# Patient Record
Sex: FEMALE | Race: OTHER | HISPANIC OR LATINO | Employment: UNEMPLOYED | URBAN - METROPOLITAN AREA
[De-identification: names, ages, dates, MRNs, and addresses within clinical notes are randomized per-mention and may not be internally consistent; named-entity substitution may affect disease eponyms.]

---

## 2017-04-15 ENCOUNTER — OFFICE VISIT (OUTPATIENT)
Dept: URGENT CARE | Facility: CLINIC | Age: 37
End: 2017-04-15
Payer: COMMERCIAL

## 2017-04-15 VITALS
TEMPERATURE: 97.5 F | HEART RATE: 82 BPM | SYSTOLIC BLOOD PRESSURE: 112 MMHG | WEIGHT: 178 LBS | OXYGEN SATURATION: 99 % | RESPIRATION RATE: 16 BRPM | BODY MASS INDEX: 28.74 KG/M2 | DIASTOLIC BLOOD PRESSURE: 80 MMHG

## 2017-04-15 DIAGNOSIS — J30.1 SEASONAL ALLERGIC RHINITIS DUE TO POLLEN: ICD-10-CM

## 2017-04-15 DIAGNOSIS — L50.9 URTICARIAL RASH: ICD-10-CM

## 2017-04-15 PROCEDURE — 99214 OFFICE O/P EST MOD 30 MIN: CPT | Performed by: PHYSICIAN ASSISTANT

## 2017-04-15 RX ORDER — METHYLPREDNISOLONE 4 MG/1
TABLET ORAL
Qty: 1 TAB | Refills: 0 | Status: SHIPPED | OUTPATIENT
Start: 2017-04-15 | End: 2021-03-17

## 2017-04-15 RX ORDER — TRIAMCINOLONE ACETONIDE 40 MG/ML
40 INJECTION, SUSPENSION INTRA-ARTICULAR; INTRAMUSCULAR ONCE
Status: COMPLETED | OUTPATIENT
Start: 2017-04-15 | End: 2017-04-15

## 2017-04-15 RX ORDER — HYDROXYZINE HYDROCHLORIDE 25 MG/1
25 TABLET, FILM COATED ORAL 3 TIMES DAILY PRN
Qty: 24 TAB | Refills: 0 | Status: SHIPPED | OUTPATIENT
Start: 2017-04-15 | End: 2023-02-26

## 2017-04-15 RX ADMIN — TRIAMCINOLONE ACETONIDE 40 MG: 40 INJECTION, SUSPENSION INTRA-ARTICULAR; INTRAMUSCULAR at 12:24

## 2017-04-15 ASSESSMENT — ENCOUNTER SYMPTOMS
NEUROLOGICAL NEGATIVE: 1
MUSCULOSKELETAL NEGATIVE: 1
RESPIRATORY NEGATIVE: 1
CONSTITUTIONAL NEGATIVE: 1

## 2017-04-15 NOTE — MR AVS SNAPSHOT
Milly Singh   4/15/2017 11:30 AM   Office Visit   MRN: [id number]    Department:  [facility]   Dept Phone:  [phone]    Description:  Female : 1980   Provider:  Javid Monzon PA-C           Reason for Visit     Rash x 2 days, itchy rash all over      Allergies as of 4/15/2017     Allergen Noted Reactions    Chlorpheniramine 04/15/2013   Rash    Penicillins 2009       Phenylephrine 04/15/2013   Rash      You were diagnosed with     Urticarial rash   [511329]       Seasonal allergic rhinitis due to pollen   [[id number]]         Vital Signs     Blood Pressure Pulse Temperature Respirations Weight Oxygen Saturation    112/80 mmHg 82 36.4 °C (97.5 °F) 16 80.74 kg (178 lb) 99%    Smoking Status                   Never Smoker            Basic Information     Date Of Birth Sex Race Ethnicity Preferred Language    1980 Female  or   Origin (Slovak,Maltese,Mozambican,Malawian, etc) English      Problem List              ICD-10-CM Priority Class Noted - Resolved    Thyroid enlargement E04.9   2014 - Present    History of acute pancreatitis Z87.19   2014 - Present    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      Below and/or attached are the medications your provider expects you to take. Review all of your home medications and newly ordered medications with your provider and/or pharmacist. Follow medication instructions as directed by your provider and/or pharmacist. Please keep your medication list with you and share with your provider. Update the information when medications are discontinued, doses are changed, or new medications (including over-the-counter products) are added; and carry  medication information at all times in the event of emergency situations     Allergies:  CHLORPHENIRAMINE - Rash     PENICILLINS - (reactions not documented)     PHENYLEPHRINE - Rash               Medications  Valid as of: April 15, 2017 - 12:27 PM    Generic Name Brand Name Tablet Size Instructions for use    Digestive Enzymes   Take  by mouth 4 times a day.        HydrOXYzine HCl (Tab) ATARAX 25 MG Take 1 Tab by mouth 3 times a day as needed for Itching (or rash).        MethylPREDNISolone (Tablet Therapy Pack) MEDROL DOSEPAK 4 MG U.D.        .                 Medicines prescribed today were sent to:     [facility] [address] [place] [address] (), [place] - [address]    [address] () [place] [address]    Phone: [phone] Fax: [phone]    Open 24 Hours?: No      Medication refill instructions:       If your prescription bottle indicates you have medication refills left, it is not necessary to call your provider’s office. Please contact your pharmacy and they will refill your medication.    If your prescription bottle indicates you do not have any refills left, you may request refills at any time through one of the following ways: The online R-Squared system (except Urgent Care), by calling your provider’s office, or by asking your pharmacy to contact your provider’s office with a refill request. Medication refills are processed only during regular business hours and may not be available until the next business day. Your provider may request additional information or to have a follow-up visit with you prior to refilling your medication.   *Please Note: Medication refills are assigned a new Rx number when refilled electronically. Your pharmacy may indicate that no refills were authorized even though a new prescription for the same medication is available at the pharmacy. Please request the medicine by name with the pharmacy before contacting your provider for a refill.           R-Squared Access Code:  [email or web address]  Expires: 5/15/2017 12:27 PM    Histogen  A secure, online tool to manage your health information     [employer]’s Histogen® is a secure, online tool that connects you to your personalized health information from the privacy of your home -- day or night - making it very easy for you to manage your healthcare. Once the activation process is completed, you can even access your medical information using the Histogen indira, which is available for free in the Apple Indira store or Google Play store.     Histogen provides the following levels of access (as shown below):   My Chart Features   [facility] Primary Care Doctor [facility]  Specialists [facility]  Urgent  Care Non-[facility]  Primary Care  Doctor   Email your healthcare team securely and privately 24/7 X X X    Manage appointments: schedule your next appointment; view details of past/upcoming appointments X      Request prescription refills. X      View recent personal medical records, including lab and immunizations X X X X   View health record, including health history, allergies, medications X X X X   Read reports about your outpatient visits, procedures, consult and ER notes X X X X   See your discharge summary, which is a recap of your hospital and/or ER visit that includes your diagnosis, lab results, and care plan. X X       How to register for Histogen:  1. Go to  [email or web address].  2. Click on the Sign Up Now box, which takes you to the New Member Sign Up page. You will need to provide the following information:  a. Enter your Histogen Access Code exactly as it appears at the top of this page. (You will not need to use this code after you’ve completed the sign-up process. If you do not sign up before the expiration date, you must request a new code.)   b. Enter your date of birth.   c. Enter your home email address.   d. Click Submit, and follow the next screen’s instructions.  3. Create a Histogen ID. This will be your Histogen login ID and cannot be  changed, so think of one that is secure and easy to remember.  4. Create a Ioxus password. You can change your password at any time.  5. Enter your Password Reset Question and Answer. This can be used at a later time if you forget your password.   6. Enter your e-mail address. This allows you to receive e-mail notifications when new information is available in Ioxus.  7. Click Sign Up. You can now view your health information.    For assistance activating your Ioxus account, call [phone]

## 2017-04-15 NOTE — PROGRESS NOTES
Subjective:      Milly Singh is a 36 y.o. female who presents with Rash            Rash  This is a new problem. The current episode started yesterday. The problem is unchanged. The rash is diffuse. The rash is characterized by itchiness. Past treatments include nothing. The treatment provided no relief. There is no history of allergies or eczema.       Review of Systems   Constitutional: Negative.    HENT: Negative.    Respiratory: Negative.    Musculoskeletal: Negative.    Skin: Positive for itching and rash.   Neurological: Negative.           Objective:     /80 mmHg  Pulse 82  Temp(Src) 36.4 °C (97.5 °F)  Resp 16  Wt 80.74 kg (178 lb)  SpO2 99%     Physical Exam   Constitutional: She is oriented to person, place, and time. She appears well-developed and well-nourished. No distress.   HENT:   Head: Normocephalic and atraumatic.   Mouth/Throat: Oropharynx is clear and moist.   Musculoskeletal: She exhibits no tenderness.   Neurological: She is alert and oriented to person, place, and time. She exhibits normal muscle tone. Coordination normal.   Skin: Skin is warm and dry. Rash noted.   Psychiatric: She has a normal mood and affect. Her behavior is normal. Judgment and thought content normal.   Nursing note and vitals reviewed.    Filed Vitals:    04/15/17 1158   BP: 112/80   Pulse: 82   Temp: 36.4 °C (97.5 °F)   Resp: 16   Weight: 80.74 kg (178 lb)   SpO2: 99%     Active Ambulatory Problems     Diagnosis Date Noted   • Thyroid enlargement 06/13/2014   • History of acute pancreatitis 06/13/2014   • Mucinous neoplasm of pancreas 07/30/2014   • Impaired fasting glucose 11/03/2014     Resolved Ambulatory Problems     Diagnosis Date Noted   • Pancreatitis 06/01/2014   • Hypokalemia 06/02/2014     Past Medical History   Diagnosis Date   • Heart burn      Current Outpatient Prescriptions on File Prior to Visit   Medication Sig Dispense Refill   • Digestive Enzymes (PAPAYA ENZYME PO) Take  by mouth 4 times a  day.       No current facility-administered medications on file prior to visit.     Gargles, Cepacol lozenges, Aleve/Advil as needed for throat pain  Family History   Problem Relation Age of Onset   • Cancer Maternal Aunt 41     breast cancer   • Diabetes Maternal Grandfather      Chlorpheniramine; Penicillins; and Phenylephrine              Assessment/Plan:     ·  urt.rash      · Kenalog 40mg; flonase; atarax

## 2017-04-19 ENCOUNTER — TELEPHONE (OUTPATIENT)
Dept: URGENT CARE | Facility: CLINIC | Age: 37
End: 2017-04-19

## 2017-09-18 ENCOUNTER — HOSPITAL ENCOUNTER (OUTPATIENT)
Facility: MEDICAL CENTER | Age: 37
End: 2017-09-18
Attending: NURSE PRACTITIONER
Payer: COMMERCIAL

## 2017-09-18 ENCOUNTER — OFFICE VISIT (OUTPATIENT)
Dept: MEDICAL GROUP | Facility: MEDICAL CENTER | Age: 37
End: 2017-09-18
Payer: COMMERCIAL

## 2017-09-18 VITALS
RESPIRATION RATE: 16 BRPM | SYSTOLIC BLOOD PRESSURE: 116 MMHG | WEIGHT: 180 LBS | OXYGEN SATURATION: 98 % | HEIGHT: 66 IN | BODY MASS INDEX: 28.93 KG/M2 | DIASTOLIC BLOOD PRESSURE: 68 MMHG | TEMPERATURE: 98 F | HEART RATE: 72 BPM

## 2017-09-18 DIAGNOSIS — R73.09 ELEVATED HEMOGLOBIN A1C: ICD-10-CM

## 2017-09-18 DIAGNOSIS — N89.8 VAGINAL IRRITATION: ICD-10-CM

## 2017-09-18 DIAGNOSIS — Z01.419 ENCOUNTER FOR GYNECOLOGICAL EXAMINATION WITHOUT ABNORMAL FINDING: ICD-10-CM

## 2017-09-18 DIAGNOSIS — Z97.5 IUD (INTRAUTERINE DEVICE) IN PLACE: ICD-10-CM

## 2017-09-18 DIAGNOSIS — E04.9 THYROID ENLARGEMENT: ICD-10-CM

## 2017-09-18 DIAGNOSIS — D49.0 MUCINOUS NEOPLASM OF PANCREAS: ICD-10-CM

## 2017-09-18 LAB
CANDIDA DNA VAG QL PROBE+SIG AMP: NEGATIVE
G VAGINALIS DNA VAG QL PROBE+SIG AMP: NEGATIVE
T VAGINALIS DNA VAG QL PROBE+SIG AMP: NEGATIVE

## 2017-09-18 PROCEDURE — 87660 TRICHOMONAS VAGIN DIR PROBE: CPT

## 2017-09-18 PROCEDURE — 87624 HPV HI-RISK TYP POOLED RSLT: CPT

## 2017-09-18 PROCEDURE — 87480 CANDIDA DNA DIR PROBE: CPT

## 2017-09-18 PROCEDURE — 88175 CYTOPATH C/V AUTO FLUID REDO: CPT

## 2017-09-18 PROCEDURE — 99000 SPECIMEN HANDLING OFFICE-LAB: CPT | Performed by: NURSE PRACTITIONER

## 2017-09-18 PROCEDURE — 87510 GARDNER VAG DNA DIR PROBE: CPT

## 2017-09-18 PROCEDURE — 99395 PREV VISIT EST AGE 18-39: CPT | Performed by: NURSE PRACTITIONER

## 2017-09-18 ASSESSMENT — PATIENT HEALTH QUESTIONNAIRE - PHQ9: CLINICAL INTERPRETATION OF PHQ2 SCORE: 0

## 2017-09-18 NOTE — PROGRESS NOTES
CC:  Pap/Well Woman Exam    History of present illness:  Milly Singh is 36 y.o.  female presenting today to \[facility]\"" care and for well woman exam with gynecological exam and Pap smear. We discussed:    Mucinous neoplasm of pancreas  Drained with MRCP in , no further problems    Thyroid enlargement  Prior notes reviewed, u/s ordered in the past but not completed  Some weight gain- 5 lb in about 2 months  No fatigue    Vaginal irritation  Itching, burning every month just prior to period  No current symptoms  paraguard placed about 7 years ago  LMP was 10 days late, pink spotting for a few days then regular bleeding  Using generic feminine wash in shower  Cotton underwear     Elevated hemoglobin A1c  5.7 in review of labs  Watching her diet, no regular exercise no polyuria, polydipsia, numbness or tingling in extremities      Past Medical History:   Diagnosis Date   • Impaired fasting glucose 11/3/2014   • History of acute pancreatitis 2014   • Pancreatitis 2014    Idiopathic 2014: Lipase was 1407 on admission and 281 on discharge. Lipids were normal. IMAGING: Gallbladder ultrasound showed small simple appearing cyst immediately adjacent to the pancreatic tail, not felt to be of clinical significance. Unremarkable findings, otherwise     • Heart burn        Past Surgical History:   Procedure Laterality Date   • GASTROSCOPY WITH BIOPSY  2014    Performed by Fredi Davis M.D. at SURGERY [facility]   • EGD WITH ASP/BX  2014    Performed by Fredi Davis M.D. at [facility]   • EYE SURGERY  2008    [facility]       Outpatient Encounter Prescriptions as of 2017   Medication Sig Dispense Refill   • hydrOXYzine (ATARAX) 25 MG Tab Take 1 Tab by mouth 3 times a day as needed for Itching (or rash). 24 Tab 0   • MethylPREDNISolone (MEDROL DOSEPAK) 4 MG Tablet Therapy Pack U.D. 1 Tab 0   • Digestive Enzymes (PAPAYA ENZYME PO) Take  by mouth 4 times a  "day.       No facility-administered encounter medications on file as of 9/18/2017.        Patient Active Problem List    Diagnosis Date Noted   • Elevated hemoglobin A1c 09/18/2017   • IUD (intrauterine device) in place 09/18/2017   • Vaginal irritation 09/18/2017   • Impaired fasting glucose 11/03/2014   • Mucinous neoplasm of pancreas 07/30/2014   • Thyroid enlargement 06/13/2014   • History of acute pancreatitis 06/13/2014       .  Social History     Social History   • Marital status:      Spouse name: N/A   • Number of children: N/A   • Years of education: N/A     Occupational History   • Not on file.     Social History Main Topics   • Smoking status: Never Smoker   • Smokeless tobacco: Never Used   • Alcohol use No   • Drug use: No   • Sexual activity: Yes     Partners: Male      Comment: IUD - copper      Other Topics Concern   • Not on file     Social History Narrative    . 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        Family History   Problem Relation Age of Onset   • Cancer Maternal Aunt 41     breast cancer   • Diabetes Maternal Grandfather          ROS:  Denies Weight loss, fatigue, chest pain, SOB, bowel or bladder changes. No significant dysmenorrhea, concerning vaginal discharge or irritation, no dyspareunia or postcoital bleeding. Denies h/o migraine with aura. Denies musculoskeletal, neurological, or psychiatric problems.      /68   Pulse 72   Temp 36.7 °C (98 °F)   Resp 16   Ht 1.664 m (5' 5.5\")   Wt 81.6 kg (180 lb)   SpO2 98%   BMI 29.50 kg/m²     GEN:  Appears well and in no apparent distress   NECK:  Supple without adenopathy, thyromegaly without mass or nodule  LUNGS:  Clear and equal. No wheeze, ronchi, or rales.  CV:  RRR, S1, S2. No murmur.  Pedal pulses 2+ bilaterally.  BREAST:  Symmetrical without masses. No nipple discharge.  ABD:  Soft, non-tender, non-distended, normal bowel sounds.  No hepatosplenomegaly.  :  Normal external female genitalia.  Vaginal canal clear.  " Cervix appears normal. IUD strings visible,. Specimen collected from transformation zone. Bimanual exam:  No CMT, normal size uterus without masses or tenderness; no adnexal masses or tenderness.      Assessment and plan    1. Encounter for gynecological examination without abnormal finding  Normal GYN exam. Pap sent, follow-up pending results. Breast self-exam encouraged monthly. General health and wellness discussion including healthy diet and regular exercise  - THINPREP PAP WITH HPV; Future  2. Mucinous neoplasm of pancreas  Seen by gastroenterology in 2014, no further problems  3. Elevated hemoglobin A1c  - HEMOGLOBIN A1C; Future  - COMP METABOLIC PANEL; Future  4. Thyroid enlargement  Thyromegaly without nodules appreciated. Ultrasound has been ordered in the past but not completed.  New order Given today, labs as listed below  - TSH WITH REFLEX TO FT4; Future  - US-SOFT TISSUES OF HEAD - NECK; Future  5. IUD (intrauterine device) in place  Doing well with paraguard IUD  6. Vaginal irritation   recurrent vaginal irritation just prior to menses each month, not currently experiencing any symptoms. Possibility of yeast vaginitis or bacterial vaginitis discussed. Hygiene recommendations reviewed and advised daily probiotic  - VAGINAL PATHOGENS DNA PANEL; Future      F/u pending results

## 2017-09-18 NOTE — ASSESSMENT & PLAN NOTE
Itching, burning every month just prior to period  No current symptoms  paraguard placed about 7 years ago  LMP was 10 days late, pink spotting for a few days then regular bleeding  Using generic feminine wash in shower  Cotton underwear

## 2017-09-18 NOTE — ASSESSMENT & PLAN NOTE
Prior notes reviewed, u/s ordered in the past but not completed  Some weight gain- 5 lb in about 2 months  No fatigue

## 2017-09-19 ENCOUNTER — TELEPHONE (OUTPATIENT)
Dept: MEDICAL GROUP | Facility: MEDICAL CENTER | Age: 37
End: 2017-09-19

## 2017-09-19 LAB
CYTOLOGY REG CYTOL: ABNORMAL
HPV HR 12 DNA CVX QL NAA+PROBE: POSITIVE
HPV16 DNA SPEC QL NAA+PROBE: NEGATIVE
HPV18 DNA SPEC QL NAA+PROBE: NEGATIVE
SPECIMEN SOURCE: ABNORMAL

## 2017-09-19 NOTE — TELEPHONE ENCOUNTER
----- Message from DAVON Palomino sent at 9/19/2017  7:37 AM PDT -----  Please inform pt:  Vaginal swab shows no evidence of bacterial or yeast infection. Take the probiotic and change soap as we discussed. Please f/u if continuing to have difficulty

## 2017-09-20 ENCOUNTER — TELEPHONE (OUTPATIENT)
Dept: MEDICAL GROUP | Facility: MEDICAL CENTER | Age: 37
End: 2017-09-20

## 2017-09-20 ENCOUNTER — HOSPITAL ENCOUNTER (OUTPATIENT)
Dept: LAB | Facility: MEDICAL CENTER | Age: 37
End: 2017-09-20
Attending: NURSE PRACTITIONER
Payer: COMMERCIAL

## 2017-09-20 DIAGNOSIS — E04.9 THYROID ENLARGEMENT: ICD-10-CM

## 2017-09-20 DIAGNOSIS — R73.09 ELEVATED HEMOGLOBIN A1C: ICD-10-CM

## 2017-09-20 LAB
ALBUMIN SERPL BCP-MCNC: 4.2 G/DL (ref 3.2–4.9)
ALBUMIN/GLOB SERPL: 1.3 G/DL
ALP SERPL-CCNC: 69 U/L (ref 30–99)
ALT SERPL-CCNC: 18 U/L (ref 2–50)
ANION GAP SERPL CALC-SCNC: 9 MMOL/L (ref 0–11.9)
AST SERPL-CCNC: 16 U/L (ref 12–45)
BILIRUB SERPL-MCNC: 0.5 MG/DL (ref 0.1–1.5)
BUN SERPL-MCNC: 14 MG/DL (ref 8–22)
CALCIUM SERPL-MCNC: 9 MG/DL (ref 8.5–10.5)
CHLORIDE SERPL-SCNC: 105 MMOL/L (ref 96–112)
CO2 SERPL-SCNC: 26 MMOL/L (ref 20–33)
CREAT SERPL-MCNC: 0.55 MG/DL (ref 0.5–1.4)
EST. AVERAGE GLUCOSE BLD GHB EST-MCNC: 114 MG/DL
GFR SERPL CREATININE-BSD FRML MDRD: >60 ML/MIN/1.73 M 2
GLOBULIN SER CALC-MCNC: 3.2 G/DL (ref 1.9–3.5)
GLUCOSE SERPL-MCNC: 87 MG/DL (ref 65–99)
HBA1C MFR BLD: 5.6 % (ref 0–5.6)
POTASSIUM SERPL-SCNC: 3.9 MMOL/L (ref 3.6–5.5)
PROT SERPL-MCNC: 7.4 G/DL (ref 6–8.2)
SODIUM SERPL-SCNC: 140 MMOL/L (ref 135–145)
TSH SERPL DL<=0.005 MIU/L-ACNC: 2.99 UIU/ML (ref 0.3–3.7)

## 2017-09-20 PROCEDURE — 84443 ASSAY THYROID STIM HORMONE: CPT

## 2017-09-20 PROCEDURE — 83036 HEMOGLOBIN GLYCOSYLATED A1C: CPT

## 2017-09-20 PROCEDURE — 80053 COMPREHEN METABOLIC PANEL: CPT

## 2017-09-20 PROCEDURE — 36415 COLL VENOUS BLD VENIPUNCTURE: CPT

## 2017-09-20 NOTE — TELEPHONE ENCOUNTER
----- Message from Tri Hatch M.D. sent at 9/20/2017 11:41 AM PDT -----  Please contact pt re results return to clinic in 1 year for repeat cotesting   Tri Hatch M.D.

## 2017-09-25 NOTE — TELEPHONE ENCOUNTER
Please notify patient of thyroid function, average blood sugar, kidney function, liver function are all normal.  Christoph Meeks M.D.

## 2019-09-11 ENCOUNTER — HOSPITAL ENCOUNTER (OUTPATIENT)
Facility: MEDICAL CENTER | Age: 39
End: 2019-09-11
Attending: NURSE PRACTITIONER
Payer: COMMERCIAL

## 2019-09-11 ENCOUNTER — OFFICE VISIT (OUTPATIENT)
Dept: MEDICAL GROUP | Facility: MEDICAL CENTER | Age: 39
End: 2019-09-11
Payer: COMMERCIAL

## 2019-09-11 VITALS
RESPIRATION RATE: 16 BRPM | TEMPERATURE: 97.8 F | HEART RATE: 82 BPM | OXYGEN SATURATION: 98 % | BODY MASS INDEX: 29.25 KG/M2 | WEIGHT: 182 LBS | DIASTOLIC BLOOD PRESSURE: 70 MMHG | HEIGHT: 66 IN | SYSTOLIC BLOOD PRESSURE: 100 MMHG

## 2019-09-11 DIAGNOSIS — N89.8 VAGINAL IRRITATION: ICD-10-CM

## 2019-09-11 DIAGNOSIS — R10.30 LOWER ABDOMINAL PAIN: ICD-10-CM

## 2019-09-11 DIAGNOSIS — R10.9 RIGHT LATERAL ABDOMINAL PAIN: ICD-10-CM

## 2019-09-11 PROCEDURE — 87491 CHLMYD TRACH DNA AMP PROBE: CPT

## 2019-09-11 PROCEDURE — 87086 URINE CULTURE/COLONY COUNT: CPT

## 2019-09-11 PROCEDURE — 87510 GARDNER VAG DNA DIR PROBE: CPT

## 2019-09-11 PROCEDURE — 87660 TRICHOMONAS VAGIN DIR PROBE: CPT

## 2019-09-11 PROCEDURE — 99214 OFFICE O/P EST MOD 30 MIN: CPT | Performed by: NURSE PRACTITIONER

## 2019-09-11 PROCEDURE — 87591 N.GONORRHOEAE DNA AMP PROB: CPT

## 2019-09-11 PROCEDURE — 87480 CANDIDA DNA DIR PROBE: CPT

## 2019-09-11 RX ORDER — AZELASTINE 1 MG/ML
1 SPRAY, METERED NASAL 2 TIMES DAILY
COMMUNITY
End: 2021-09-02

## 2019-09-11 ASSESSMENT — PATIENT HEALTH QUESTIONNAIRE - PHQ9: CLINICAL INTERPRETATION OF PHQ2 SCORE: 0

## 2019-09-12 ENCOUNTER — PATIENT MESSAGE (OUTPATIENT)
Dept: MEDICAL GROUP | Facility: MEDICAL CENTER | Age: 39
End: 2019-09-12

## 2019-09-12 LAB
C TRACH DNA SPEC QL NAA+PROBE: NEGATIVE
CANDIDA DNA VAG QL PROBE+SIG AMP: NEGATIVE
G VAGINALIS DNA VAG QL PROBE+SIG AMP: NEGATIVE
N GONORRHOEA DNA SPEC QL NAA+PROBE: NEGATIVE
SPECIMEN SOURCE: NORMAL
T VAGINALIS DNA VAG QL PROBE+SIG AMP: NEGATIVE

## 2019-09-12 NOTE — PROGRESS NOTES
Subjective:     Chief Complaint   Patient presents with   • UTI     PAIN ON SIDE      Milly Singh is a 38 y.o. female established patient here for evaluation of R sided abd discomfort as well as vaginal odor/ irritation  The abdominal discomfort started about 1 week ago, there is no particular aggravating or alleviating factors.  Comes and goes unexpectedly and not associated with eating.  She describes it as mild to moderate, sharp at times.  There is no associated nausea, vomiting, stool changes, bloating.  She denies constipation or diarrhea.  She does have history of mucinous cyst removed from the pancreas a few years ago.  No history of renal calculi.  No fever, chills  Vaginal irritation seems to come and go, tends to occur before her menses and then cleared up for a period of time afterwards.  The irritation and odor tend to be worse after intercourse.  She has not had any recent change in hygiene products, not using any fragranced products in the vaginal area.  No new partners, no irregular bleeding or postcoital bleeding.  She has a ParaGard IUD    No problem-specific Assessment & Plan notes found for this encounter.       Current medicines (including changes today)  Current Outpatient Medications   Medication Sig Dispense Refill   • azelastine (ASTELIN) 137 MCG/SPRAY nasal spray [place] 1 Spray in nose 2 times a day.     • hydrOXYzine (ATARAX) 25 MG Tab Take 1 Tab by mouth 3 times a day as needed for Itching (or rash). 24 Tab 0   • MethylPREDNISolone (MEDROL DOSEPAK) 4 MG Tablet Therapy Pack U.D. (Patient not taking: Reported on 9/11/2019) 1 Tab 0   • Digestive Enzymes (PAPAYA ENZYME PO) Take  by mouth 4 times a day.       No current facility-administered medications for this visit.      She  has a past medical history of Heart burn, History of acute pancreatitis (6/13/2014), Impaired fasting glucose (11/3/2014), and Pancreatitis (6/1/2014).    ROS included above     Objective:     /70 (BP Location:  "Left arm, Patient Position: Sitting, BP Cuff Size: Adult)   Pulse 82   Temp 36.6 °C (97.8 °F) (Temporal)   Resp 16   Ht 1.676 m (5' 6\")   Wt 82.6 kg (182 lb)   SpO2 98%  Body mass index is 29.38 kg/m².     Physical Exam:  General: Alert, oriented in no acute distress.  Eye contact is good, speech is normal, affect calm  Lungs: clear to auscultation bilaterally, normal effort, no wheeze/ rhonchi/ rales.  CV: regular rate and rhythm, S1, S2, no murmur  Abdomen: soft, mild right lateral abdominal tenderness on exam.  No hepatosplenomegaly, no right lower quadrant tenderness  GYN: Skin tag at 6:00 to the vaginal introitus.  External genitalia pink and moist, no lesions.  Cervical eyes clear, no visible IUD strings.  Vaginal swabs obtained.  Bimanual: Uterus midline, no CMT, no adnexal tenderness or masses  Ext: no edema, color normal, vascularity normal, temperature normal    Assessment and Plan:   The following treatment plan was discussed   1. Vaginal irritation   intermittent difficulty with vaginal irritation and odor, suspect BV.  Vaginal swab sent, I will follow-up with her pending results.  IUD strings are not visible  Chlamydia/GC PCR Urine Or Swab    VAGINAL PATHOGENS DNA PANEL   2. Right lateral abdominal pain   no acute pain on exam, she does indicate lateral abdominal pain that is anatomically higher than the appendix location.  She has no history of kidney stone, she does have history of mucinous cyst which was drained with MRCP.  UA in the office is negative for blood and nitrates, positive for leukocytes.  Will await labs as ordered, consider imaging if no improvement over the next few days.  ER precautions reviewed including worsening pain, development of fever, chills, nausea  URINE CULTURE(NEW)    POCT Urinalysis       Followup: Pending tests         Please note that this dictation was created using voice recognition software. I have worked with consultants from the vendor as well as technical " experts from [facility] to optimize the interface. I have made every reasonable attempt to correct obvious errors, but I expect that there are errors of grammar and possibly content that I did not discover before finalizing the note.

## 2019-09-12 NOTE — TELEPHONE ENCOUNTER
From: Milly Singh  To: DAVON Palomino  Sent: 9/12/2019 1:22 PM PDT  Subject: Test Result Question    Hello Dr. Hall,  Thank you for my test results. Yes, I’m doing better. I don’t feel pain right now. It bothered me a little bit last night but it’s gone right now.     Thank you!

## 2019-09-14 ENCOUNTER — PATIENT MESSAGE (OUTPATIENT)
Dept: MEDICAL GROUP | Facility: MEDICAL CENTER | Age: 39
End: 2019-09-14

## 2019-09-14 LAB
BACTERIA UR CULT: ABNORMAL
BACTERIA UR CULT: ABNORMAL
SIGNIFICANT IND 70042: ABNORMAL
SITE SITE: ABNORMAL
SOURCE SOURCE: ABNORMAL

## 2019-09-14 NOTE — TELEPHONE ENCOUNTER
From: Milly Singh  To: DAVON Palomino  Sent: 9/14/2019 1:21 PM PDT  Subject: Test Result Question    Aidee Hall, I’m feeling better now. NO pain so far. It’s been almost 2 days that I don’t feel it and was able to sleep better.   And as of the bacterial growth in my urine, is there anything I need to do or take to make it better? Is it something I need to worry about?   Thank you so much,   Have a good weekend!  ----- Message -----  From: DAVON Palomino  Sent: 9/12/2019 2:13 PM PDT  To: Milly Singh  Subject: RE: Test Result Question  Okay, thank you for letting me know. If it does continue over the next few days I would like to order an ultrasound, so keep me updated.  Marybel CHAUHAN      ----- Message -----   From: Milly Singh   Sent: 9/12/2019 1:22 PM PDT   To: DAVON Palomino  Subject: Test Result Question    Aidee Hall,  Thank you for my test results. Yes, I’m doing better. I don’t feel pain right now. It bothered me a little bit last night but it’s gone right now.     Thank you!

## 2020-11-24 ENCOUNTER — HOSPITAL ENCOUNTER (OUTPATIENT)
Dept: RADIOLOGY | Facility: MEDICAL CENTER | Age: 40
End: 2020-11-24
Attending: OBSTETRICS & GYNECOLOGY
Payer: COMMERCIAL

## 2020-11-24 DIAGNOSIS — Z12.31 VISIT FOR SCREENING MAMMOGRAM: ICD-10-CM

## 2020-11-24 PROCEDURE — 77067 SCR MAMMO BI INCL CAD: CPT

## 2021-03-17 ENCOUNTER — HOSPITAL ENCOUNTER (OUTPATIENT)
Facility: MEDICAL CENTER | Age: 41
End: 2021-03-17
Attending: NURSE PRACTITIONER
Payer: COMMERCIAL

## 2021-03-17 ENCOUNTER — OFFICE VISIT (OUTPATIENT)
Dept: MEDICAL GROUP | Facility: MEDICAL CENTER | Age: 41
End: 2021-03-17
Payer: COMMERCIAL

## 2021-03-17 VITALS
HEIGHT: 66 IN | RESPIRATION RATE: 20 BRPM | WEIGHT: 184.08 LBS | TEMPERATURE: 97.5 F | OXYGEN SATURATION: 97 % | BODY MASS INDEX: 29.58 KG/M2 | SYSTOLIC BLOOD PRESSURE: 110 MMHG | DIASTOLIC BLOOD PRESSURE: 70 MMHG | HEART RATE: 79 BPM

## 2021-03-17 DIAGNOSIS — N39.0 ACUTE UTI: ICD-10-CM

## 2021-03-17 DIAGNOSIS — B96.89 BACTERIAL VAGINITIS: ICD-10-CM

## 2021-03-17 DIAGNOSIS — N76.0 BACTERIAL VAGINITIS: ICD-10-CM

## 2021-03-17 LAB
AMBIGUOUS DTTM AMBI4: NORMAL
APPEARANCE UR: CLEAR
BILIRUB UR STRIP-MCNC: ABNORMAL MG/DL
COLOR UR AUTO: ABNORMAL
GLUCOSE UR STRIP.AUTO-MCNC: ABNORMAL MG/DL
KETONES UR STRIP.AUTO-MCNC: ABNORMAL MG/DL
LEUKOCYTE ESTERASE UR QL STRIP.AUTO: ABNORMAL
NITRITE UR QL STRIP.AUTO: ABNORMAL
PH UR STRIP.AUTO: 6 [PH] (ref 5–8)
PROT UR QL STRIP: 100 MG/DL
RBC UR QL AUTO: ABNORMAL
SIGNIFICANT IND 70042: NORMAL
SITE SITE: NORMAL
SOURCE SOURCE: NORMAL
SP GR UR STRIP.AUTO: 1.03
UROBILINOGEN UR STRIP-MCNC: 0.2 MG/DL

## 2021-03-17 PROCEDURE — 99213 OFFICE O/P EST LOW 20 MIN: CPT | Performed by: NURSE PRACTITIONER

## 2021-03-17 PROCEDURE — 87086 URINE CULTURE/COLONY COUNT: CPT

## 2021-03-17 PROCEDURE — 87077 CULTURE AEROBIC IDENTIFY: CPT | Mod: 91

## 2021-03-17 PROCEDURE — 81002 URINALYSIS NONAUTO W/O SCOPE: CPT | Performed by: NURSE PRACTITIONER

## 2021-03-17 PROCEDURE — 87186 SC STD MICRODIL/AGAR DIL: CPT

## 2021-03-17 RX ORDER — NITROFURANTOIN 25; 75 MG/1; MG/1
100 CAPSULE ORAL 2 TIMES DAILY
Qty: 10 CAPSULE | Refills: 0 | Status: SHIPPED | OUTPATIENT
Start: 2021-03-17 | End: 2021-09-02

## 2021-03-17 ASSESSMENT — PATIENT HEALTH QUESTIONNAIRE - PHQ9: CLINICAL INTERPRETATION OF PHQ2 SCORE: 0

## 2021-03-17 NOTE — PROGRESS NOTES
Subjective:     Chief Complaint   Patient presents with   • Advice Only     dysuria, burning urinating since yesterday     Milly Singh is a 40 y.o. female here today for evulation of dysuria and hematuria of dysuria and hematuria which started yesterday, woke her up in the middle of the night.  She is feeling some lower abdominal pressure, burning when she urinates, and notes a slight pink tinge to her urine.  She has had UTIs in the past although the last was several years ago.  She does not appreciate any particular trigger to this event although she does note that she has had recurrent difficulty with bacterial vaginitis occurring about a week before her menses every month.  She was seen by OB/GYN for this, treated last month with oral Flagyl but this caused her to have a severe headache.  She did not finish the course of oral medication and was switched to a topical gel.  She didn't really feel that it helped but then symptoms improved after her menses.  She does not currently have any vaginal irritation, itching, discharge.  Further denies fever, chills, nausea, back pain    No problem-specific Assessment & Plan notes found for this encounter.       Current medicines (including changes today)  Current Outpatient Medications   Medication Sig Dispense Refill   • nitrofurantoin (MACROBID) 100 MG Cap Take 1 capsule by mouth 2 times a day. 10 capsule 0   • azelastine (ASTELIN) 137 MCG/SPRAY nasal spray [place] 1 Spray in nose 2 times a day.     • hydrOXYzine (ATARAX) 25 MG Tab Take 1 Tab by mouth 3 times a day as needed for Itching (or rash). 24 Tab 0     No current facility-administered medications for this visit.     She  has a past medical history of Heart burn, History of acute pancreatitis (6/13/2014), Impaired fasting glucose (11/3/2014), and Pancreatitis (6/1/2014).    ROS included above     Objective:     /70 (BP Location: Right arm, Patient Position: Sitting, BP Cuff Size: Adult)   Pulse 79   Temp  "36.4 °C (97.5 °F) (Temporal)   Resp 20   Ht 1.676 m (5' 6\")   Wt 83.5 kg (184 lb 1.4 oz)   SpO2 97%  Body mass index is 29.71 kg/m².     Physical Exam:  General: Alert, oriented in no acute distress.  Eye contact is good, speech is normal, affect calm  Lungs: clear to auscultation bilaterally, normal effort, no wheeze/ rhonchi/ rales.  CV: regular rate and rhythm, S1, S2, no murmur  Abdomen: soft, nontender, No CVAT  Ext: no edema, color normal, vascularity normal, temperature normal    Assessment and Plan:   The following treatment plan was discussed  1. Acute UTI   UA suggestive of infection.  Will start Macrobid, advised to increase fluids.  Culture sent  POCT Urinalysis    URINE CULTURE(NEW)    nitrofurantoin (MACROBID) 100 MG Cap   2. Bacterial vaginitis   this is been a recurrent issue occurring about a week before her menses every month.  She did not really get any improvement with topical MetroGel last time.  Symptoms are not present today.  Encouraged trial of probiotic which may help to prevent episodes in the future       Followup: pending culture         Please note that this dictation was created using voice recognition software. I have worked with consultants from the vendor as well as technical experts from [employer] to optimize the interface. I have made every reasonable attempt to correct obvious errors, but I expect that there are errors of grammar and possibly content that I did not discover before finalizing the note.       "

## 2021-03-26 DIAGNOSIS — N39.0 ACUTE UTI: ICD-10-CM

## 2021-03-26 RX ORDER — CIPROFLOXACIN 500 MG/1
500 TABLET, FILM COATED ORAL 2 TIMES DAILY
Qty: 14 TABLET | Refills: 0 | Status: SHIPPED | OUTPATIENT
Start: 2021-03-26 | End: 2021-04-02

## 2021-07-12 ENCOUNTER — HOSPITAL ENCOUNTER (OUTPATIENT)
Facility: MEDICAL CENTER | Age: 41
End: 2021-07-12
Attending: NURSE PRACTITIONER
Payer: COMMERCIAL

## 2021-07-12 ENCOUNTER — OFFICE VISIT (OUTPATIENT)
Dept: MEDICAL GROUP | Facility: MEDICAL CENTER | Age: 41
End: 2021-07-12
Payer: COMMERCIAL

## 2021-07-12 VITALS
TEMPERATURE: 97.7 F | BODY MASS INDEX: 29.87 KG/M2 | SYSTOLIC BLOOD PRESSURE: 110 MMHG | HEIGHT: 66 IN | RESPIRATION RATE: 18 BRPM | HEART RATE: 78 BPM | DIASTOLIC BLOOD PRESSURE: 70 MMHG | OXYGEN SATURATION: 97 % | WEIGHT: 185.85 LBS

## 2021-07-12 DIAGNOSIS — R30.0 DYSURIA: ICD-10-CM

## 2021-07-12 DIAGNOSIS — N64.4 BREAST PAIN, LEFT: ICD-10-CM

## 2021-07-12 PROBLEM — N76.0 BACTERIAL VAGINITIS: Status: RESOLVED | Noted: 2021-03-17 | Resolved: 2021-07-12

## 2021-07-12 PROBLEM — B96.89 BACTERIAL VAGINITIS: Status: RESOLVED | Noted: 2021-03-17 | Resolved: 2021-07-12

## 2021-07-12 PROBLEM — N89.8 VAGINAL IRRITATION: Status: RESOLVED | Noted: 2017-09-18 | Resolved: 2021-07-12

## 2021-07-12 LAB
APPEARANCE UR: CLEAR
BILIRUB UR STRIP-MCNC: NORMAL MG/DL
COLOR UR AUTO: YELLOW
GLUCOSE UR STRIP.AUTO-MCNC: NORMAL MG/DL
KETONES UR STRIP.AUTO-MCNC: NORMAL MG/DL
LEUKOCYTE ESTERASE UR QL STRIP.AUTO: NORMAL
NITRITE UR QL STRIP.AUTO: NORMAL
PH UR STRIP.AUTO: 7 [PH] (ref 5–8)
PROT UR QL STRIP: NORMAL MG/DL
RBC UR QL AUTO: NORMAL
SP GR UR STRIP.AUTO: 1.01
UROBILINOGEN UR STRIP-MCNC: 0.2 MG/DL

## 2021-07-12 PROCEDURE — 99213 OFFICE O/P EST LOW 20 MIN: CPT | Performed by: NURSE PRACTITIONER

## 2021-07-12 PROCEDURE — 81002 URINALYSIS NONAUTO W/O SCOPE: CPT | Performed by: NURSE PRACTITIONER

## 2021-07-12 PROCEDURE — 87086 URINE CULTURE/COLONY COUNT: CPT

## 2021-07-12 RX ORDER — NITROFURANTOIN 25; 75 MG/1; MG/1
100 CAPSULE ORAL 2 TIMES DAILY
Qty: 10 CAPSULE | Refills: 0 | Status: SHIPPED | OUTPATIENT
Start: 2021-07-12 | End: 2021-09-02

## 2021-07-12 NOTE — ASSESSMENT & PLAN NOTE
Here today with concerns of outer left breast pain waxing and waning for several months now.  This started back in January of this year, seems to come and go unpredictably.  Possibly some correlation with the menstrual cycle, unsure.  She describes the pain as a burning sensation wrapping around the outer breast toward the nipple.  States that it is so painful she is unable to lay on her left side at times.  She has not felt any abnormalities on self-exam.  She now feels that she is having some changes to the appearance of her nipple.  Denies nipple discharge.  Maternal aunt had breast cancer  She did have a mammogram in November which was normal

## 2021-07-12 NOTE — PROGRESS NOTES
Subjective:     Chief Complaint   Patient presents with   • Advice Only     breast and nipple intermittent pain since January that has worsened, no nipple discharge.    • Advice Only     UTI symptoms, increase frequency, burning and dysuria, lower ab cramps and left flank.      Milly Singh is a 40 y.o. female here today to follow up on:    Dysuria  Burning with urination and lower abdominal discomfort/cramping starting about 5 days ago.  She started taking over-the-counter cranberry supplements and feels that this is provided some slight relief but has not resolved her symptoms.  She is still having some urgency and frequency.  She does have history of positive urine cultures.  No fever, chills, hematuria, nausea, back pain    Breast pain, left  Here today with concerns of outer left breast pain waxing and waning for several months now.  This started back in January of this year, seems to come and go unpredictably.  Possibly some correlation with the menstrual cycle, unsure.  She describes the pain as a burning sensation wrapping around the outer breast toward the nipple.  States that it is so painful she is unable to lay on her left side at times.  She has not felt any abnormalities on self-exam.  She now feels that she is having some changes to the appearance of her nipple.  Denies nipple discharge.  Maternal aunt had breast cancer  She did have a mammogram in November which was normal       Current medicines (including changes today)  Current Outpatient Medications   Medication Sig Dispense Refill   • nitrofurantoin (MACROBID) 100 MG Cap Take 1 capsule by mouth 2 times a day. 10 capsule 0   • azelastine (ASTELIN) 137 MCG/SPRAY nasal spray [place] 1 Spray in nose 2 times a day.     • hydrOXYzine (ATARAX) 25 MG Tab Take 1 Tab by mouth 3 times a day as needed for Itching (or rash). 24 Tab 0   • nitrofurantoin (MACROBID) 100 MG Cap Take 1 capsule by mouth 2 times a day. (Patient not taking: Reported on 7/12/2021) 10  "capsule 0     No current facility-administered medications for this visit.     She  has a past medical history of Heart burn, History of acute pancreatitis (6/13/2014), Impaired fasting glucose (11/3/2014), and Pancreatitis (6/1/2014).    ROS included above     Objective:     /70 (BP Location: Left arm, Patient Position: Sitting, BP Cuff Size: Adult)   Pulse 78   Temp 36.5 °C (97.7 °F) (Temporal)   Resp 18   Ht 1.67 m (5' 5.75\")   Wt 84.3 kg (185 lb 13.6 oz)   SpO2 97%  Body mass index is 30.23 kg/m².     Physical Exam:  General: Alert, oriented in no acute distress.  Eye contact is good, speech is normal, affect calm  Lungs: clear to auscultation bilaterally, normal effort, no wheeze/ rhonchi/ rales.  CV: regular rate and rhythm, S1, S2, no murmur  Breast: smooth, symmetrical. No masses noted, no dimpling, no nipple discharge.  No skin erythema or rash  Abd: Soft, mild suprapubic tenderness  Ext: no edema, color normal, vascularity normal, temperature normal    Assessment and Plan:   The following treatment plan was discussed   1. Dysuria   based on symptoms I will start her on an antibiotic for UTI, urine sent for culture.  Encouraged to push fluids  URINE CULTURE(NEW)    nitrofurantoin (MACROBID) 100 MG Cap    POCT Urinalysis   2. Breast pain, left   no abnormality on clinical breast exam.  She feels that the appearance of her nipple is changed although I do not see any difference on the left than the right.  There is no nipple discharge.  We have discussed that breast pain is most commonly related to hormonal fluctuation rather than a more serious underlying etiology.  We will go ahead and obtain diagnostic imaging as this is been ongoing for some time  US-BREAST LIMITED-LEFT    MA DIAGNOSTIC MAMMO LEFT W/CAD       Followup: Pending imaging         Please note that this dictation was created using voice recognition software. I have worked with consultants from the vendor as well as technical experts " from [facility] to optimize the interface. I have made every reasonable attempt to correct obvious errors, but I expect that there are errors of grammar and possibly content that I did not discover before finalizing the note.

## 2021-07-12 NOTE — ASSESSMENT & PLAN NOTE
Burning with urination and lower abdominal discomfort/cramping starting about 5 days ago.  She started taking over-the-counter cranberry supplements and feels that this is provided some slight relief but has not resolved her symptoms.  She is still having some urgency and frequency.  She does have history of positive urine cultures.  No fever, chills, hematuria, nausea, back pain

## 2021-07-14 ENCOUNTER — PATIENT MESSAGE (OUTPATIENT)
Dept: MEDICAL GROUP | Facility: MEDICAL CENTER | Age: 41
End: 2021-07-14

## 2021-07-14 LAB
BACTERIA UR CULT: NORMAL
SIGNIFICANT IND 70042: NORMAL
SITE SITE: NORMAL
SOURCE SOURCE: NORMAL

## 2021-07-14 NOTE — TELEPHONE ENCOUNTER
From: Milly Singh  To: Nurse Practitioner Claire Hall  Sent: 7/14/2021 3:00 PM PDT  Subject: Test Result Question    Sorry one more question:  Can I take probiotics while on antibiotics?       ----- Message -----   From:Nurse Practitioner Claire Hall   Sent:7/14/2021 2:47 PM PDT   To:Milly Singh   Subject:RE: Test Result Question    There are different types of bacteria that are normally found in the vaginal area which is called urogenital mely, and this is what seen on your study. 1 more looking for urinary tract infection the more common finding is E. coli, this was not seen in your culture. I think it is fine for you to finish the antibiotic course, and then we can just monitor from there.      ----- Message -----   From:Milly Singh   Sent:7/14/2021 1:00 PM PDT   To:Nurse Practitioner Claire Hall   Subject:Test Result Question    Aidee Hall hope you're doing well.   So what kind of bacteria is it? Is it the same one it showed up on my results back in March. If so, why does it keep coming back.   Right now I'm doing better, it took awhile for the antibiotics to start working. I'll keep taking them until gone.   Thank You!!!

## 2021-09-01 ENCOUNTER — PATIENT MESSAGE (OUTPATIENT)
Dept: MEDICAL GROUP | Facility: MEDICAL CENTER | Age: 41
End: 2021-09-01

## 2021-09-01 NOTE — PATIENT COMMUNICATION
Spoke to patient about COVID, set up an apt for tomorrow.       COVID 19 TEST QUESTIONS: Answer Yes/No    Is this your first COVID 19 test? yes  Are you employed in healthcare? no  Are you having any symptoms? yes  What symptoms you are having? Sore throat, cough, runny nose, back pain, lost of sense of smell   Date of symptom onset? 8/27/2021  Are you a resident in a congregate care setting? no  Are you pregnant? no  Did you got exposed to COVID positive person in last 30 days? Not that she knows of

## 2021-09-02 ENCOUNTER — OFFICE VISIT (OUTPATIENT)
Dept: MEDICAL GROUP | Facility: MEDICAL CENTER | Age: 41
End: 2021-09-02
Payer: COMMERCIAL

## 2021-09-02 ENCOUNTER — HOSPITAL ENCOUNTER (OUTPATIENT)
Facility: MEDICAL CENTER | Age: 41
End: 2021-09-02
Attending: FAMILY MEDICINE
Payer: COMMERCIAL

## 2021-09-02 VITALS
BODY MASS INDEX: 29.41 KG/M2 | TEMPERATURE: 98 F | DIASTOLIC BLOOD PRESSURE: 74 MMHG | HEIGHT: 66 IN | RESPIRATION RATE: 14 BRPM | SYSTOLIC BLOOD PRESSURE: 112 MMHG | OXYGEN SATURATION: 96 % | HEART RATE: 84 BPM | WEIGHT: 183 LBS

## 2021-09-02 DIAGNOSIS — J02.9 SORE THROAT: ICD-10-CM

## 2021-09-02 DIAGNOSIS — J06.9 ACUTE URI: ICD-10-CM

## 2021-09-02 PROCEDURE — 99213 OFFICE O/P EST LOW 20 MIN: CPT | Performed by: FAMILY MEDICINE

## 2021-09-02 PROCEDURE — U0003 INFECTIOUS AGENT DETECTION BY NUCLEIC ACID (DNA OR RNA); SEVERE ACUTE RESPIRATORY SYNDROME CORONAVIRUS 2 (SARS-COV-2) (CORONAVIRUS DISEASE [COVID-19]), AMPLIFIED PROBE TECHNIQUE, MAKING USE OF HIGH THROUGHPUT TECHNOLOGIES AS DESCRIBED BY CMS-2020-01-R: HCPCS

## 2021-09-02 PROCEDURE — U0005 INFEC AGEN DETEC AMPLI PROBE: HCPCS

## 2021-09-02 NOTE — PATIENT INSTRUCTIONS
mucinex over the counter to help with nasal drainage and cough.  For the sinus pressure use Sudafed decongestant.  Afrin but only short term, do not use longer then 3 days  Try nasocort or flonase to help with nasal inflammation  Make sure you drink plenty of water.  Tylenol and motrin for pain or discomfort.     Upper Respiratory Infection, Adult  An upper respiratory infection (URI) is a common viral infection of the nose, throat, and upper air passages that lead to the lungs. The most common type of URI is the common cold. URIs usually get better on their own, without medical treatment.  What are the causes?  A URI is caused by a virus. You may catch a virus by:  · Breathing in droplets from an infected person's cough or sneeze.  · Touching something that has been exposed to the virus (contaminated) and then touching your mouth, nose, or eyes.  What increases the risk?  You are more likely to get a URI if:  · You are very young or very old.  · It is abdiel or winter.  · You have close contact with others, such as at a , school, or health care facility.  · You smoke.  · You have long-term (chronic) heart or lung disease.  · You have a weakened disease-fighting (immune) system.  · You have nasal allergies or asthma.  · You are experiencing a lot of stress.  · You work in an area that has poor air circulation.  · You have poor nutrition.  What are the signs or symptoms?  A URI usually involves some of the following symptoms:  · Runny or stuffy (congested) nose.  · Sneezing.  · Cough.  · Sore throat.  · Headache.  · Fatigue.  · Fever.  · Loss of appetite.  · Pain in your forehead, behind your eyes, and over your cheekbones (sinus pain).  · Muscle aches.  · Redness or irritation of the eyes.  · Pressure in the ears or face.  How is this diagnosed?  This condition may be diagnosed based on your medical history and symptoms, and a physical exam. Your health care provider may use a cotton swab to take a mucus  sample from your nose (nasal swab). This sample can be tested to determine what virus is causing the illness.  How is this treated?  URIs usually get better on their own within 7-10 days. You can take steps at home to relieve your symptoms. Medicines cannot cure URIs, but your health care provider may recommend certain medicines to help relieve symptoms, such as:  · Over-the-counter cold medicines.  · Cough suppressants. Coughing is a type of defense against infection that helps to clear the respiratory system, so take these medicines only as recommended by your health care provider.  · Fever-reducing medicines.  Follow these instructions at home:  Activity  · Rest as needed.  · If you have a fever, stay home from work or school until your fever is gone or until your health care provider says you are no longer contagious. Your health care provider may have you wear a face mask to prevent your infection from spreading.  Relieving symptoms  · Gargle with a salt-water mixture 3-4 times a day or as needed. To make a salt-water mixture, completely dissolve ½-1 tsp of salt in 1 cup of warm water.  · Use a cool-mist humidifier to add moisture to the air. This can help you breathe more easily.  Eating and drinking    · Drink enough fluid to keep your urine pale yellow.  · Eat soups and other clear broths.  General instructions    · Take over-the-counter and prescription medicines only as told by your health care provider. These include cold medicines, fever reducers, and cough suppressants.  · Do not use any products that contain nicotine or tobacco, such as cigarettes and e-cigarettes. If you need help quitting, ask your health care provider.  · Stay away from secondhand smoke.  · Stay up to date on all immunizations, including the yearly (annual) flu vaccine.  · Keep all follow-up visits as told by your health care provider. This is important.  How to prevent the spread of infection to others    · URIs can be passed from  person to person (are contagious). To prevent the infection from spreading:  ? Wash your hands often with soap and water. If soap and water are not available, use hand .  ? Avoid touching your mouth, face, eyes, or nose.  ? Cough or sneeze into a tissue or your sleeve or elbow instead of into your hand or into the air.  Contact a health care provider if:  · You are getting worse instead of better.  · You have a fever or chills.  · Your mucus is brown or red.  · You have yellow or brown discharge coming from your nose.  · You have pain in your face, especially when you bend forward.  · You have swollen neck glands.  · You have pain while swallowing.  · You have white areas in the back of your throat.  Get help right away if:  · You have shortness of breath that gets worse.  · You have severe or persistent:  ? Headache.  ? Ear pain.  ? Sinus pain.  ? Chest pain.  · You have chronic lung disease along with any of the following:  ? Wheezing.  ? Prolonged cough.  ? Coughing up blood.  ? A change in your usual mucus.  · You have a stiff neck.  · You have changes in your:  ? Vision.  ? Hearing.  ? Thinking.  ? Mood.  Summary  · An upper respiratory infection (URI) is a common infection of the nose, throat, and upper air passages that lead to the lungs.  · A URI is caused by a virus.  · URIs usually get better on their own within 7-10 days.  · Medicines cannot cure URIs, but your health care provider may recommend certain medicines to help relieve symptoms.  This information is not intended to replace advice given to you by your health care provider. Make sure you discuss any questions you have with your health care provider.  Document Released: 06/13/2002 Document Revised: 12/26/2019 Document Reviewed: 08/03/2018  Elsevier Patient Education © 2020 [employer]

## 2021-09-02 NOTE — ASSESSMENT & PLAN NOTE
Sore throat on 08/27 then developed runny nose and dry couhg.  The next day body aches.  Yesterday she lost the sense of smell, but has retained the sence of taste.    Denies fever or difficulty with breathing.  No COVID exposure and up to date with COVID vaccine with her last does 04/16

## 2021-09-02 NOTE — PROGRESS NOTES
"  Chief Complaint   Patient presents with   • Pharyngitis     x6 days   • Cough     x 6 days   • Runny Nose     x6 days   • Back Pain     x1 day   • Other     \"Loss of smell\"        HPI: Patient is a 40 y.o. female complaining of 6 days of illness including: dry cough, ear pain, nasal congestion, rhinorrhea, sore throat.   Mucus is: thick.  Similarly ill exposures: no.  Treatments tried: None  She  reports that she has never smoked. She has never used smokeless tobacco..     ROS:  No fever, shortness of breath, difficulty breathing, nausea, sinus pain and pressure, changes in bowel movements or skin rash.     I reviewed the patient's medications, allergies and medical history:  Current Outpatient Medications   Medication Sig Dispense Refill   • hydrOXYzine (ATARAX) 25 MG Tab Take 1 Tab by mouth 3 times a day as needed for Itching (or rash). (Patient not taking: Reported on 9/2/2021) 24 Tab 0     No current facility-administered medications for this visit.     Chlorpheniramine, Penicillins, and Phenylephrine  Past Medical History:   Diagnosis Date   • Heart burn    • History of acute pancreatitis 6/13/2014   • Impaired fasting glucose 11/3/2014   • Pancreatitis 6/1/2014    Idiopathic June 2014: Lipase was 1407 on admission and 281 on discharge. Lipids were normal. IMAGING: Gallbladder ultrasound showed small simple appearing cyst immediately adjacent to the pancreatic tail, not felt to be of clinical significance. Unremarkable findings, otherwise          EXAM:  /74 (BP Location: Left arm, Patient Position: Sitting, BP Cuff Size: Adult)   Pulse 84   Temp 36.7 °C (98 °F) (Temporal)   Resp 14   Ht 1.676 m (5' 6\")   Wt 83 kg (183 lb)   SpO2 96%   General: Alert, no conversational dyspnea or audible wheeze, non-toxic appearance.  Eyes: PERRL, conjunctiva slightly injected, no eye discharge.  Ears: Normal pinnae,TM's intact bilaterally, left TM injected with mild effusion, no bulging.  Nares: Patent with thick " mucus.  Bilateral turbinates swollen and inflamed  Sinuses: non tender over maxillary / frontal sinuses.  Throat: Erythematous injection without exudate.   Neck: Supple, with no adenopathy.  Lungs: Clear to auscultation bilaterally, no wheeze, crackles or rhonchi.   Heart: Regular rate without murmur.  Skin: Warm and dry without rash.     ASSESSMENT:   1. Sore throat    2. Acute URI         PLAN:  1. Educated patient that majority of upper respiratory infections are viral and do not need antibiotics.   As symptoms have been worsening over the last week, will treat with antibiotics.  2. Twice daily use of nasal saline rinse or Neti-Pot.  3. OTC anti-pyretics and decongestants as needed.  4. Follow-up in office or urgent care for worsening symptoms, difficulty breathing, lack of expected recovery, or should new symptoms or problems arise.

## 2021-09-03 DIAGNOSIS — J02.9 SORE THROAT: ICD-10-CM

## 2021-09-03 LAB
COVID ORDER STATUS COVID19: NORMAL
SARS-COV-2 RNA RESP QL NAA+PROBE: NOTDETECTED
SPECIMEN SOURCE: NORMAL

## 2022-09-27 ENCOUNTER — OFFICE VISIT (OUTPATIENT)
Dept: MEDICAL GROUP | Facility: MEDICAL CENTER | Age: 42
End: 2022-09-27
Payer: COMMERCIAL

## 2022-09-27 ENCOUNTER — HOSPITAL ENCOUNTER (OUTPATIENT)
Dept: RADIOLOGY | Facility: MEDICAL CENTER | Age: 42
End: 2022-09-27
Attending: FAMILY MEDICINE
Payer: COMMERCIAL

## 2022-09-27 ENCOUNTER — HOSPITAL ENCOUNTER (OUTPATIENT)
Facility: MEDICAL CENTER | Age: 42
End: 2022-09-27
Attending: FAMILY MEDICINE
Payer: COMMERCIAL

## 2022-09-27 ENCOUNTER — HOSPITAL ENCOUNTER (OUTPATIENT)
Dept: LAB | Facility: MEDICAL CENTER | Age: 42
End: 2022-09-27
Attending: FAMILY MEDICINE
Payer: COMMERCIAL

## 2022-09-27 VITALS
DIASTOLIC BLOOD PRESSURE: 62 MMHG | BODY MASS INDEX: 30.59 KG/M2 | TEMPERATURE: 97.5 F | WEIGHT: 183.6 LBS | OXYGEN SATURATION: 95 % | SYSTOLIC BLOOD PRESSURE: 114 MMHG | HEART RATE: 89 BPM | HEIGHT: 65 IN

## 2022-09-27 DIAGNOSIS — R07.82 INTERCOSTAL PAIN: ICD-10-CM

## 2022-09-27 DIAGNOSIS — R30.0 DYSURIA: ICD-10-CM

## 2022-09-27 DIAGNOSIS — R07.89 OTHER CHEST PAIN: ICD-10-CM

## 2022-09-27 PROBLEM — R07.9 CHEST PAIN: Status: ACTIVE | Noted: 2022-09-27

## 2022-09-27 LAB
ALBUMIN SERPL BCP-MCNC: 4.8 G/DL (ref 3.2–4.9)
ALBUMIN/GLOB SERPL: 1.5 G/DL
ALP SERPL-CCNC: 79 U/L (ref 30–99)
ALT SERPL-CCNC: 21 U/L (ref 2–50)
ANION GAP SERPL CALC-SCNC: 9 MMOL/L (ref 7–16)
APPEARANCE UR: CLEAR
AST SERPL-CCNC: 17 U/L (ref 12–45)
BASOPHILS # BLD AUTO: 0.4 % (ref 0–1.8)
BASOPHILS # BLD: 0.03 K/UL (ref 0–0.12)
BILIRUB SERPL-MCNC: 0.3 MG/DL (ref 0.1–1.5)
BILIRUB UR STRIP-MCNC: NORMAL MG/DL
BUN SERPL-MCNC: 9 MG/DL (ref 8–22)
CALCIUM SERPL-MCNC: 9.3 MG/DL (ref 8.4–10.2)
CHLORIDE SERPL-SCNC: 104 MMOL/L (ref 96–112)
CO2 SERPL-SCNC: 26 MMOL/L (ref 20–33)
COLOR UR AUTO: YELLOW
CREAT SERPL-MCNC: 0.56 MG/DL (ref 0.5–1.4)
D DIMER PPP IA.FEU-MCNC: 0.42 UG/ML (FEU) (ref 0–0.5)
EOSINOPHIL # BLD AUTO: 0.04 K/UL (ref 0–0.51)
EOSINOPHIL NFR BLD: 0.6 % (ref 0–6.9)
ERYTHROCYTE [DISTWIDTH] IN BLOOD BY AUTOMATED COUNT: 44.6 FL (ref 35.9–50)
FASTING STATUS PATIENT QL REPORTED: NORMAL
GFR SERPLBLD CREATININE-BSD FMLA CKD-EPI: 115 ML/MIN/1.73 M 2
GLOBULIN SER CALC-MCNC: 3.1 G/DL (ref 1.9–3.5)
GLUCOSE SERPL-MCNC: 94 MG/DL (ref 65–99)
GLUCOSE UR STRIP.AUTO-MCNC: NORMAL MG/DL
HCT VFR BLD AUTO: 42.8 % (ref 37–47)
HGB BLD-MCNC: 13.9 G/DL (ref 12–16)
IMM GRANULOCYTES # BLD AUTO: 0.02 K/UL (ref 0–0.11)
IMM GRANULOCYTES NFR BLD AUTO: 0.3 % (ref 0–0.9)
KETONES UR STRIP.AUTO-MCNC: NORMAL MG/DL
LEUKOCYTE ESTERASE UR QL STRIP.AUTO: NORMAL
LYMPHOCYTES # BLD AUTO: 2.15 K/UL (ref 1–4.8)
LYMPHOCYTES NFR BLD: 30 % (ref 22–41)
MCH RBC QN AUTO: 30.8 PG (ref 27–33)
MCHC RBC AUTO-ENTMCNC: 32.5 G/DL (ref 33.6–35)
MCV RBC AUTO: 94.7 FL (ref 81.4–97.8)
MONOCYTES # BLD AUTO: 0.43 K/UL (ref 0–0.85)
MONOCYTES NFR BLD AUTO: 6 % (ref 0–13.4)
NEUTROPHILS # BLD AUTO: 4.5 K/UL (ref 2–7.15)
NEUTROPHILS NFR BLD: 62.7 % (ref 44–72)
NITRITE UR QL STRIP.AUTO: NORMAL
NRBC # BLD AUTO: 0 K/UL
NRBC BLD-RTO: 0 /100 WBC
PH UR STRIP.AUTO: 7 [PH] (ref 5–8)
PLATELET # BLD AUTO: 248 K/UL (ref 164–446)
PMV BLD AUTO: 9.2 FL (ref 9–12.9)
POTASSIUM SERPL-SCNC: 4.1 MMOL/L (ref 3.6–5.5)
PROT SERPL-MCNC: 7.9 G/DL (ref 6–8.2)
PROT UR QL STRIP: NORMAL MG/DL
RBC # BLD AUTO: 4.52 M/UL (ref 4.2–5.4)
RBC UR QL AUTO: NORMAL
SODIUM SERPL-SCNC: 139 MMOL/L (ref 135–145)
SP GR UR STRIP.AUTO: 1.01
TROPONIN T SERPL-MCNC: <6 NG/L (ref 6–19)
UROBILINOGEN UR STRIP-MCNC: 0.2 MG/DL
WBC # BLD AUTO: 7.2 K/UL (ref 4.8–10.8)

## 2022-09-27 PROCEDURE — 80053 COMPREHEN METABOLIC PANEL: CPT

## 2022-09-27 PROCEDURE — 99213 OFFICE O/P EST LOW 20 MIN: CPT | Performed by: FAMILY MEDICINE

## 2022-09-27 PROCEDURE — 87086 URINE CULTURE/COLONY COUNT: CPT

## 2022-09-27 PROCEDURE — 84484 ASSAY OF TROPONIN QUANT: CPT

## 2022-09-27 PROCEDURE — 71046 X-RAY EXAM CHEST 2 VIEWS: CPT

## 2022-09-27 PROCEDURE — 81002 URINALYSIS NONAUTO W/O SCOPE: CPT | Performed by: FAMILY MEDICINE

## 2022-09-27 PROCEDURE — 93000 ELECTROCARDIOGRAM COMPLETE: CPT | Performed by: FAMILY MEDICINE

## 2022-09-27 PROCEDURE — 85379 FIBRIN DEGRADATION QUANT: CPT

## 2022-09-27 PROCEDURE — 85025 COMPLETE CBC W/AUTO DIFF WBC: CPT

## 2022-09-27 PROCEDURE — 36415 COLL VENOUS BLD VENIPUNCTURE: CPT

## 2022-09-27 ASSESSMENT — ENCOUNTER SYMPTOMS
COUGH: 0
DIAPHORESIS: 0
FEVER: 0
PALPITATIONS: 0
NAUSEA: 0
SHORTNESS OF BREATH: 0
VOMITING: 0
CHILLS: 0

## 2022-09-27 NOTE — ASSESSMENT & PLAN NOTE
Acute.  Suspect musculoskeletal in nature, discussed with the patient about ruling out cardiovascular or pulmonary reasons for discomfort.  EKG completed in clinic, normal sinus rhythm with no ectopy.  Chest x-ray ordered.  Troponin, D-dimer, CBC, CMP ordered for the patient.  Recommended taking ibuprofen 600 mg as needed for discomfort.

## 2022-09-27 NOTE — PROGRESS NOTES
Milly Singh is a pleasant 41 y.o. female here for   Chief Complaint   Patient presents with    Chest Pain     X 3 days, pushing tight feeling,      HPI:    Problem   Chest Pain    Started over the weekend.  Woke up with soreness to her chest that moves to her back.  Noticed poping to the mid chest off/ on for the last year, denies increase in popping since her soreness started.  Increase in soreness with movement, twisting, laying on her side.  Denies pain with deep breathing or palpitation.  Denies any recent injruys or heavy activity, no recent illnesses.       Dysuria    Started last week for 2 days then improved.  dysuira at the end of her stream  Took cranberry pills to help.  + urinary frequency and lower abdominal cramping, improved  + green vaginal discharge, but cleared up.  Was having a lot of vaginal discharge, but normal now  Denies labial irritation.   Reports resolution of her symptoms.          Current Medicines (including changes today)  Current Outpatient Medications   Medication Sig Dispense Refill    hydrOXYzine (ATARAX) 25 MG Tab Take 1 Tab by mouth 3 times a day as needed for Itching (or rash). (Patient not taking: Reported on 9/2/2021) 24 Tab 0     No current facility-administered medications for this visit.     Past Medical/ Surgical History  She  has a past medical history of Heart burn, History of acute pancreatitis (6/13/2014), Impaired fasting glucose (11/3/2014), and Pancreatitis (6/1/2014).  She  has a past surgical history that includes eye surgery (Jan 2008); gastroscopy with biopsy (7/30/2014); and egd with asp/bx (7/30/2014).    Review of Systems   Constitutional:  Negative for chills, diaphoresis, fever and malaise/fatigue.   Respiratory:  Negative for cough and shortness of breath.    Cardiovascular:  Positive for chest pain (mid substernal, worse with movement). Negative for palpitations.   Gastrointestinal:  Negative for nausea and vomiting.       Objective:     /62 (BP  "Location: Left arm, Patient Position: Sitting, BP Cuff Size: Adult)   Pulse 89   Temp 36.4 °C (97.5 °F) (Temporal)   Ht 1.651 m (5' 5\")   Wt 83.3 kg (183 lb 9.6 oz)   SpO2 95%  Body mass index is 30.55 kg/m².    Physical Exam   EKG Interpretation-HR is 74 normal EKG, normal sinus rhythm    Imaging:  Recent imaging to review    Labs  No recent labs to review  Assessment and Plan:   The following treatment plan was discussed     Problem List Items Addressed This Visit       Dysuria     Acute.  POCT UA completed and trace leuks otherwise negative.  Low suspicion for UTI.  Possible bacterial vaginosis, recommended boric acid vaginal suppositories since she has had resolution of her symptoms.  Recommended to reach back out to clinic if symptoms return.         Relevant Orders    POCT Urinalysis (Completed)    URINE CULTURE(NEW)    Chest pain     Acute.  Suspect musculoskeletal in nature, discussed with the patient about ruling out cardiovascular or pulmonary reasons for discomfort.  EKG completed in clinic, normal sinus rhythm with no ectopy.  Chest x-ray ordered.  Troponin, D-dimer, CBC, CMP ordered for the patient.  Recommended taking ibuprofen 600 mg as needed for discomfort.           Relevant Orders    TROPONIN (Completed)    D-DIMER (Completed)    DX-CHEST-2 VIEWS    EKG - Clinic Performed (Completed)    CBC WITH DIFFERENTIAL (Completed)    Comp Metabolic Panel (Completed)    ESTIMATED GFR (Completed)        Followup: Return in about 2 weeks (around 10/11/2022) for Establish care and review results.    I have placed EKG orders.  The MA is preforming EKG orders under the direction of Dr. Hall      Please note that this dictation was created using voice recognition software. I have made every reasonable attempt to correct obvious errors, but I expect that there are errors of grammar and possibly content that I did not discover before finalizing the note.         "

## 2022-09-27 NOTE — ASSESSMENT & PLAN NOTE
Acute.  POCT UA completed and trace leuks otherwise negative.  Low suspicion for UTI.  Possible bacterial vaginosis, recommended boric acid vaginal suppositories since she has had resolution of her symptoms.  Recommended to reach back out to clinic if symptoms return.

## 2022-09-28 DIAGNOSIS — R30.0 DYSURIA: ICD-10-CM

## 2022-09-30 LAB
BACTERIA UR CULT: NORMAL
SIGNIFICANT IND 70042: NORMAL
SITE SITE: NORMAL
SOURCE SOURCE: NORMAL

## 2022-10-10 SDOH — ECONOMIC STABILITY: INCOME INSECURITY: HOW HARD IS IT FOR YOU TO PAY FOR THE VERY BASICS LIKE FOOD, HOUSING, MEDICAL CARE, AND HEATING?: PATIENT DECLINED

## 2022-10-10 SDOH — ECONOMIC STABILITY: FOOD INSECURITY: WITHIN THE PAST 12 MONTHS, YOU WORRIED THAT YOUR FOOD WOULD RUN OUT BEFORE YOU GOT MONEY TO BUY MORE.: PATIENT DECLINED

## 2022-10-10 SDOH — ECONOMIC STABILITY: TRANSPORTATION INSECURITY
IN THE PAST 12 MONTHS, HAS THE LACK OF TRANSPORTATION KEPT YOU FROM MEDICAL APPOINTMENTS OR FROM GETTING MEDICATIONS?: PATIENT DECLINED

## 2022-10-10 SDOH — ECONOMIC STABILITY: HOUSING INSECURITY: IN THE LAST 12 MONTHS, HOW MANY PLACES HAVE YOU LIVED?: 1

## 2022-10-10 SDOH — HEALTH STABILITY: PHYSICAL HEALTH: ON AVERAGE, HOW MANY MINUTES DO YOU ENGAGE IN EXERCISE AT THIS LEVEL?: 20 MIN

## 2022-10-10 SDOH — ECONOMIC STABILITY: HOUSING INSECURITY
IN THE LAST 12 MONTHS, WAS THERE A TIME WHEN YOU DID NOT HAVE A STEADY PLACE TO SLEEP OR SLEPT IN A SHELTER (INCLUDING NOW)?: PATIENT REFUSED

## 2022-10-10 SDOH — ECONOMIC STABILITY: TRANSPORTATION INSECURITY
IN THE PAST 12 MONTHS, HAS LACK OF TRANSPORTATION KEPT YOU FROM MEETINGS, WORK, OR FROM GETTING THINGS NEEDED FOR DAILY LIVING?: PATIENT DECLINED

## 2022-10-10 SDOH — HEALTH STABILITY: PHYSICAL HEALTH: ON AVERAGE, HOW MANY DAYS PER WEEK DO YOU ENGAGE IN MODERATE TO STRENUOUS EXERCISE (LIKE A BRISK WALK)?: 3 DAYS

## 2022-10-10 SDOH — ECONOMIC STABILITY: INCOME INSECURITY: IN THE LAST 12 MONTHS, WAS THERE A TIME WHEN YOU WERE NOT ABLE TO PAY THE MORTGAGE OR RENT ON TIME?: PATIENT REFUSED

## 2022-10-10 SDOH — ECONOMIC STABILITY: TRANSPORTATION INSECURITY
IN THE PAST 12 MONTHS, HAS LACK OF RELIABLE TRANSPORTATION KEPT YOU FROM MEDICAL APPOINTMENTS, MEETINGS, WORK OR FROM GETTING THINGS NEEDED FOR DAILY LIVING?: PATIENT DECLINED

## 2022-10-10 SDOH — HEALTH STABILITY: MENTAL HEALTH
STRESS IS WHEN SOMEONE FEELS TENSE, NERVOUS, ANXIOUS, OR CAN'T SLEEP AT NIGHT BECAUSE THEIR MIND IS TROUBLED. HOW STRESSED ARE YOU?: NOT AT ALL

## 2022-10-10 SDOH — ECONOMIC STABILITY: FOOD INSECURITY: WITHIN THE PAST 12 MONTHS, THE FOOD YOU BOUGHT JUST DIDN'T LAST AND YOU DIDN'T HAVE MONEY TO GET MORE.: PATIENT DECLINED

## 2022-10-10 ASSESSMENT — SOCIAL DETERMINANTS OF HEALTH (SDOH)
HOW OFTEN DO YOU HAVE SIX OR MORE DRINKS ON ONE OCCASION: NEVER
HOW OFTEN DO YOU HAVE A DRINK CONTAINING ALCOHOL: MONTHLY OR LESS
HOW OFTEN DO YOU GET TOGETHER WITH FRIENDS OR RELATIVES?: ONCE A WEEK
HOW OFTEN DO YOU GET TOGETHER WITH FRIENDS OR RELATIVES?: ONCE A WEEK
WITHIN THE PAST 12 MONTHS, YOU WORRIED THAT YOUR FOOD WOULD RUN OUT BEFORE YOU GOT THE MONEY TO BUY MORE: PATIENT DECLINED
HOW OFTEN DO YOU ATTEND CHURCH OR RELIGIOUS SERVICES?: MORE THAN 4 TIMES PER YEAR
HOW OFTEN DO YOU ATTENT MEETINGS OF THE CLUB OR ORGANIZATION YOU BELONG TO?: NEVER
HOW OFTEN DO YOU ATTEND CHURCH OR RELIGIOUS SERVICES?: MORE THAN 4 TIMES PER YEAR
HOW OFTEN DO YOU ATTENT MEETINGS OF THE CLUB OR ORGANIZATION YOU BELONG TO?: NEVER
DO YOU BELONG TO ANY CLUBS OR ORGANIZATIONS SUCH AS CHURCH GROUPS UNIONS, FRATERNAL OR ATHLETIC GROUPS, OR SCHOOL GROUPS?: NO
DO YOU BELONG TO ANY CLUBS OR ORGANIZATIONS SUCH AS CHURCH GROUPS UNIONS, FRATERNAL OR ATHLETIC GROUPS, OR SCHOOL GROUPS?: NO
IN A TYPICAL WEEK, HOW MANY TIMES DO YOU TALK ON THE PHONE WITH FAMILY, FRIENDS, OR NEIGHBORS?: MORE THAN THREE TIMES A WEEK
HOW HARD IS IT FOR YOU TO PAY FOR THE VERY BASICS LIKE FOOD, HOUSING, MEDICAL CARE, AND HEATING?: PATIENT DECLINED
IN A TYPICAL WEEK, HOW MANY TIMES DO YOU TALK ON THE PHONE WITH FAMILY, FRIENDS, OR NEIGHBORS?: MORE THAN THREE TIMES A WEEK
HOW MANY DRINKS CONTAINING ALCOHOL DO YOU HAVE ON A TYPICAL DAY WHEN YOU ARE DRINKING: 1 OR 2

## 2022-10-10 ASSESSMENT — LIFESTYLE VARIABLES
HOW OFTEN DO YOU HAVE A DRINK CONTAINING ALCOHOL: MONTHLY OR LESS
HOW MANY STANDARD DRINKS CONTAINING ALCOHOL DO YOU HAVE ON A TYPICAL DAY: 1 OR 2
AUDIT-C TOTAL SCORE: 1
SKIP TO QUESTIONS 9-10: 1
HOW OFTEN DO YOU HAVE SIX OR MORE DRINKS ON ONE OCCASION: NEVER

## 2022-10-11 ENCOUNTER — OFFICE VISIT (OUTPATIENT)
Dept: MEDICAL GROUP | Facility: MEDICAL CENTER | Age: 42
End: 2022-10-11
Payer: COMMERCIAL

## 2022-10-11 VITALS
BODY MASS INDEX: 30.99 KG/M2 | OXYGEN SATURATION: 97 % | HEART RATE: 85 BPM | HEIGHT: 65 IN | WEIGHT: 186 LBS | DIASTOLIC BLOOD PRESSURE: 64 MMHG | SYSTOLIC BLOOD PRESSURE: 120 MMHG | TEMPERATURE: 97.6 F

## 2022-10-11 DIAGNOSIS — Z23 IMMUNIZATION DUE: ICD-10-CM

## 2022-10-11 DIAGNOSIS — Z13.0 SCREENING FOR DEFICIENCY ANEMIA: ICD-10-CM

## 2022-10-11 DIAGNOSIS — Z87.19 HISTORY OF ACUTE PANCREATITIS: ICD-10-CM

## 2022-10-11 DIAGNOSIS — R07.89 OTHER CHEST PAIN: ICD-10-CM

## 2022-10-11 DIAGNOSIS — E04.9 THYROID ENLARGEMENT: ICD-10-CM

## 2022-10-11 DIAGNOSIS — E66.9 OBESITY (BMI 30-39.9): ICD-10-CM

## 2022-10-11 DIAGNOSIS — Z12.31 ENCOUNTER FOR SCREENING MAMMOGRAM FOR MALIGNANT NEOPLASM OF BREAST: ICD-10-CM

## 2022-10-11 DIAGNOSIS — R30.0 DYSURIA: ICD-10-CM

## 2022-10-11 DIAGNOSIS — Z13.220 ENCOUNTER FOR LIPID SCREENING FOR CARDIOVASCULAR DISEASE: ICD-10-CM

## 2022-10-11 DIAGNOSIS — Z97.5 PRESENCE OF INTRAUTERINE CONTRACEPTIVE DEVICE: ICD-10-CM

## 2022-10-11 DIAGNOSIS — R73.09 ELEVATED HEMOGLOBIN A1C: ICD-10-CM

## 2022-10-11 DIAGNOSIS — Z13.6 ENCOUNTER FOR LIPID SCREENING FOR CARDIOVASCULAR DISEASE: ICD-10-CM

## 2022-10-11 PROBLEM — J02.9 SORE THROAT: Status: RESOLVED | Noted: 2021-09-02 | Resolved: 2022-10-11

## 2022-10-11 PROBLEM — N39.0 ACUTE UTI: Status: RESOLVED | Noted: 2021-03-17 | Resolved: 2022-10-11

## 2022-10-11 PROBLEM — N64.4 BREAST PAIN, LEFT: Status: RESOLVED | Noted: 2021-07-12 | Resolved: 2022-10-11

## 2022-10-11 PROCEDURE — 90471 IMMUNIZATION ADMIN: CPT | Performed by: FAMILY MEDICINE

## 2022-10-11 PROCEDURE — 90715 TDAP VACCINE 7 YRS/> IM: CPT | Performed by: FAMILY MEDICINE

## 2022-10-11 PROCEDURE — 90686 IIV4 VACC NO PRSV 0.5 ML IM: CPT | Performed by: FAMILY MEDICINE

## 2022-10-11 PROCEDURE — 90472 IMMUNIZATION ADMIN EACH ADD: CPT | Performed by: FAMILY MEDICINE

## 2022-10-11 PROCEDURE — 99214 OFFICE O/P EST MOD 30 MIN: CPT | Mod: 25 | Performed by: FAMILY MEDICINE

## 2022-10-11 RX ORDER — AZELASTINE HYDROCHLORIDE 137 UG/1
SPRAY, METERED NASAL
COMMUNITY

## 2022-10-11 RX ORDER — AZELASTINE HYDROCHLORIDE, FLUTICASONE PROPIONATE 137; 50 UG/1; UG/1
SPRAY, METERED NASAL
COMMUNITY
End: 2022-10-11

## 2022-10-11 ASSESSMENT — ENCOUNTER SYMPTOMS
VOMITING: 0
SORE THROAT: 0
CONSTIPATION: 0
WEAKNESS: 0
COUGH: 0
BRUISES/BLEEDS EASILY: 0
MYALGIAS: 0
TINGLING: 0
WEIGHT LOSS: 0
FEVER: 0
BLURRED VISION: 0
NAUSEA: 0
DIARRHEA: 0
SHORTNESS OF BREATH: 0
NERVOUS/ANXIOUS: 0
DOUBLE VISION: 0
DIZZINESS: 0
DEPRESSION: 0
CHILLS: 0
ABDOMINAL PAIN: 0
PALPITATIONS: 0

## 2022-10-11 ASSESSMENT — FIBROSIS 4 INDEX: FIB4 SCORE: 0.64

## 2022-10-11 ASSESSMENT — PATIENT HEALTH QUESTIONNAIRE - PHQ9: CLINICAL INTERPRETATION OF PHQ2 SCORE: 0

## 2022-10-11 NOTE — ASSESSMENT & PLAN NOTE
Acute, resolved.  Strongly suspect that it was related to increase in stress.  Recommended to continue to watch her symptoms.

## 2022-10-11 NOTE — ASSESSMENT & PLAN NOTE
Chronic, stable.  TSH ordered for the patient.  Physical exam did not reveal any enlarged thyroid.

## 2022-10-11 NOTE — ASSESSMENT & PLAN NOTE
Chronic, stable.  Discussed with the patient that ParaGard is good for approximately 10 years.  Recommended follow-up with gynecology for removal.  Continue routine Pap screenings.

## 2022-10-11 NOTE — PROGRESS NOTES
Milly Singh is a pleasant 41 y.o. female here to establish care.    HPI:    Problem   Obesity (Bmi 30-39.9)   Chest Pain    At the end of September woke up with soreness to her chest that moves to her back.  Noticed poping to the mid chest off/ on for the last year, denies increase in popping since her soreness started.  Increase in soreness with movement, twisting, laying on her side.  Denied pain with deep breathing or palpitation.  Denied any recent injruys or heavy activity, no recent illnesses.    Completed blood work, EKG, and chest x-ray which all came back normal, no sign of cardiovascular events.  No blood clot to her lungs.  Does report improvement of the chest discomfort has not had it since what brought her in back in September.     Elevated Hemoglobin A1c    History of elevated A1c back in 2017-5.7.  Has not had significant elevation to her sugar since that time.     IUD (intrauterine device) in place    IUD placed 2020, Paraguard     Thyroid Enlargement    Was told she may have an enlarged thyroid but did not receive any testing at that time.  Denies any hyper or hypo function of her thyroid.  Like to complete blood testing to check on her thyroid.     Sore Throat (Resolved)   Dysuria (Resolved)   Breast Pain, Left (Resolved)   Acute Uti (Resolved)   History of Acute Pancreatitis (Resolved)    Found a blister, but disappeared.  Took fluid out and repeat CT showed resolved.  Followed with GI after and was cleared by them,  Advised to return if pain ever came back            Current medicines (including changes today)  Current Outpatient Medications   Medication Sig Dispense Refill    Azelastine HCl 137 MCG/SPRAY Solution Administer  into affected nostril(S).      Multiple Vitamins-Minerals (WOMENS MULTI PO) Take  by mouth.      hydrOXYzine (ATARAX) 25 MG Tab Take 1 Tab by mouth 3 times a day as needed for Itching (or rash). 24 Tab 0     No current facility-administered medications for this visit.  "      Past Medical/ Surgical History  She  has a past medical history of Heart burn, History of acute pancreatitis (6/13/2014), History of acute pancreatitis (6/13/2014), Impaired fasting glucose (11/3/2014), and Pancreatitis (6/1/2014).  She  has a past surgical history that includes eye surgery (Jan 2008); gastroscopy with biopsy (7/30/2014); and egd with asp/bx (7/30/2014).    Social History  Social History     Tobacco Use    Smoking status: Never    Smokeless tobacco: Never   Vaping Use    Vaping Use: Never used   Substance Use Topics    Alcohol use: No    Drug use: No     Social History     Social History Narrative    . 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         Family History  Family History   Problem Relation Age of Onset    Cancer Maternal Aunt 41        breast cancer    Diabetes Maternal Grandfather      Family Status   Relation Name Status    MAunt  (Not Specified)    MGFa  (Not Specified)         Review of Systems   Constitutional:  Negative for chills, fever, malaise/fatigue and weight loss.   HENT:  Negative for hearing loss and sore throat.    Eyes:  Negative for blurred vision and double vision.   Respiratory:  Negative for cough and shortness of breath.    Cardiovascular:  Negative for chest pain, palpitations and leg swelling.   Gastrointestinal:  Negative for abdominal pain, constipation, diarrhea, nausea and vomiting.   Musculoskeletal:  Negative for myalgias.   Skin:  Negative for rash.   Neurological:  Negative for dizziness, tingling and weakness.   Endo/Heme/Allergies:  Does not bruise/bleed easily.   Psychiatric/Behavioral:  Negative for depression. The patient is not nervous/anxious.        Objective:     /64 (BP Location: Left arm, Patient Position: Sitting)   Pulse 85   Temp 36.4 °C (97.6 °F) (Temporal)   Ht 1.651 m (5' 5\")   Wt 84.4 kg (186 lb)   SpO2 97%  Body mass index is 30.95 kg/m².    Physical Exam  Constitutional:       General: She is not in acute distress.  HENT:      " Head: Normocephalic and atraumatic.      Right Ear: External ear normal.      Left Ear: External ear normal.   Eyes:      General: Lids are normal.      Extraocular Movements: Extraocular movements intact.      Conjunctiva/sclera: Conjunctivae normal.      Pupils: Pupils are equal, round, and reactive to light.   Neck:      Trachea: Trachea normal.   Cardiovascular:      Rate and Rhythm: Normal rate and regular rhythm.      Heart sounds: Normal heart sounds. No murmur heard.    No friction rub. No gallop.   Pulmonary:      Effort: Pulmonary effort is normal. No accessory muscle usage.      Breath sounds: Normal breath sounds. No wheezing or rales.   Abdominal:      General: Bowel sounds are normal.      Palpations: Abdomen is soft.      Tenderness: There is no abdominal tenderness.   Musculoskeletal:      Cervical back: Normal range of motion and neck supple.      Right lower leg: No edema.      Left lower leg: No edema.   Lymphadenopathy:      Cervical: No cervical adenopathy.   Skin:     General: Skin is warm and dry.      Findings: No rash.   Neurological:      General: No focal deficit present.      Mental Status: She is alert and oriented to person, place, and time.      GCS: GCS eye subscore is 4. GCS verbal subscore is 5. GCS motor subscore is 6.      Gait: Gait is intact.      Deep Tendon Reflexes:      Reflex Scores:       Brachioradialis reflexes are 2+ on the right side and 2+ on the left side.       Patellar reflexes are 2+ on the right side and 2+ on the left side.  Psychiatric:         Attention and Perception: Attention normal.         Mood and Affect: Affect normal.         Speech: Speech normal.        Imagin2022 DX-chest: Normal    Labs:  Most recent labs from 2022 reviewed with patient.  Assessment and Plan:   The following treatment plan was discussed     Problem List Items Addressed This Visit       Thyroid enlargement     Chronic, stable.  TSH ordered for the patient.  Physical  exam did not reveal any enlarged thyroid.         Relevant Orders    TSH WITH REFLEX TO FT4    Elevated hemoglobin A1c     We will go ahead and repeat A1c.         Relevant Orders    ESTIMATED GFR    HEMOGLOBIN A1C    Comp Metabolic Panel    IUD (intrauterine device) in place     Chronic, stable.  Discussed with the patient that ParaGard is good for approximately 10 years.  Recommended follow-up with gynecology for removal.  Continue routine Pap screenings.         Chest pain     Acute, resolved.  Strongly suspect that it was related to increase in stress.  Recommended to continue to watch her symptoms.         Obesity (BMI 30-39.9)    Relevant Orders    Patient identified as having weight management issue.  Appropriate orders and counseling given.    RESOLVED: History of acute pancreatitis    RESOLVED: Dysuria     Other Visit Diagnoses       Immunization due        Relevant Orders    INFLUENZA VACCINE QUAD INJ (PF) (Completed)    Tdap Vaccine =>8YO IM (Completed)    Encounter for lipid screening for cardiovascular disease        Relevant Orders    Lipid Profile    Screening for deficiency anemia        Relevant Orders    CBC WITH DIFFERENTIAL    Encounter for screening mammogram for malignant neoplasm of breast        Relevant Orders    MA-SCREENING MAMMO BILAT W/TOMOSYNTHESIS W/CAD             Followup: Return in about 6 months (around 4/11/2023), or if symptoms worsen or fail to improve, for follow-up on labs.    I have placed vaccination orders.  The MA is preforming vaccination orders under the direction of Dr. Hall    Please note that this dictation was created using voice recognition software. I have made every reasonable attempt to correct obvious errors, but I expect that there are errors of grammar and possibly content that I did not discover before finalizing the note.

## 2023-02-26 ENCOUNTER — OFFICE VISIT (OUTPATIENT)
Dept: URGENT CARE | Facility: CLINIC | Age: 43
End: 2023-02-26
Payer: COMMERCIAL

## 2023-02-26 VITALS
DIASTOLIC BLOOD PRESSURE: 70 MMHG | HEIGHT: 66 IN | OXYGEN SATURATION: 97 % | HEART RATE: 94 BPM | SYSTOLIC BLOOD PRESSURE: 110 MMHG | WEIGHT: 185 LBS | BODY MASS INDEX: 29.73 KG/M2 | TEMPERATURE: 98.4 F | RESPIRATION RATE: 16 BRPM

## 2023-02-26 DIAGNOSIS — J06.9 URTI (ACUTE UPPER RESPIRATORY INFECTION): ICD-10-CM

## 2023-02-26 DIAGNOSIS — J02.9 SORE THROAT: ICD-10-CM

## 2023-02-26 LAB — S PYO DNA SPEC NAA+PROBE: NOT DETECTED

## 2023-02-26 PROCEDURE — 87651 STREP A DNA AMP PROBE: CPT

## 2023-02-26 PROCEDURE — 99213 OFFICE O/P EST LOW 20 MIN: CPT

## 2023-02-26 ASSESSMENT — ENCOUNTER SYMPTOMS
FEVER: 0
CHILLS: 0
TROUBLE SWALLOWING: 1
COUGH: 1

## 2023-02-26 ASSESSMENT — FIBROSIS 4 INDEX: FIB4 SCORE: 0.66

## 2023-02-26 NOTE — PROGRESS NOTES
Subjective     Milly Singh is a 42 y.o. female who presents with Pharyngitis (SX 4 days , hurts to swallow , swollen )            Pharyngitis   This is a new problem. The current episode started in the past 7 days. The problem has been gradually worsening. There has been no fever. The pain is at a severity of 8/10. Associated symptoms include congestion, coughing, ear pain, a plugged ear sensation and trouble swallowing. Pertinent negatives include no ear discharge. She has had no exposure to strep or mono. She has tried cool liquids for the symptoms. The treatment provided mild relief.       Patient's current problem list, medications, and past medical/surgical history were reviewed in Epic.    PMH:  has a past medical history of Heart burn, History of acute pancreatitis (6/13/2014), History of acute pancreatitis (6/13/2014), Impaired fasting glucose (11/3/2014), and Pancreatitis (6/1/2014).  MEDS:   Current Outpatient Medications:     Azelastine HCl 137 MCG/SPRAY Solution, Administer  into affected nostril(S)., Disp: , Rfl:     Multiple Vitamins-Minerals (WOMENS MULTI PO), Take  by mouth., Disp: , Rfl:   ALLERGIES:   Allergies   Allergen Reactions    Chlorpheniramine Rash    Penicillins     Phenylephrine Rash     SURGHX:   Past Surgical History:   Procedure Laterality Date    GASTROSCOPY WITH BIOPSY  7/30/2014    Performed by Fredi Davis M.D. at SURGERY [facility]    EGD WITH ASP/BX  7/30/2014    Performed by Fredi Davis M.D. at SURGERY [facility]    EYE SURGERY  Jan 2008    lasik     SOCHX:  reports that she has never smoked. She has never used smokeless tobacco. She reports that she does not drink alcohol and does not use drugs.  FH: Reviewed with patient, not pertinent to this visit.     Review of Systems   Constitutional:  Negative for chills and fever.   HENT:  Positive for congestion, ear pain and trouble swallowing. Negative for ear discharge.    Respiratory:  Positive  "for cough.    All other systems reviewed and are negative.           Objective     /70 (BP Location: Left arm, Patient Position: Sitting, BP Cuff Size: Adult)   Pulse 94   Temp 36.9 °C (98.4 °F) (Temporal)   Resp 16   Ht 1.676 m (5' 6\")   Wt 83.9 kg (185 lb)   LMP 02/10/2023   SpO2 97%   BMI 29.86 kg/m²      Physical Exam  Constitutional:       Appearance: Normal appearance.   HENT:      Head: Normocephalic.      Nose: Congestion present.      Mouth/Throat:      Pharynx: Posterior oropharyngeal erythema present.   Eyes:      Extraocular Movements: Extraocular movements intact.   Cardiovascular:      Rate and Rhythm: Normal rate and regular rhythm.      Pulses: Normal pulses.      Heart sounds: Normal heart sounds.   Pulmonary:      Effort: Pulmonary effort is normal.      Breath sounds: Normal breath sounds.   Musculoskeletal:         General: Normal range of motion.      Cervical back: Normal range of motion.   Skin:     General: Skin is warm and dry.   Neurological:      General: No focal deficit present.      Mental Status: She is alert.   Psychiatric:         Mood and Affect: Mood normal.         Behavior: Behavior normal.         Judgment: Judgment normal.     Results:    Rapid strep a-negative          Assessment & Plan       1. URTI (acute upper respiratory infection)      2. Sore throat    - POCT CEPHEID GROUP A STREP - PCR       Patient tested negative for strep A.  Her presentation is consistent with an upper respiratory tract infection, most likely viral.  Advised on symptomatic treatment at home. Discussed treatment plan with patient, s/he is agreeable and verbalized understanding.  Educated patient on signs and symptoms watch out for, when to return to the clinic or go to the ER.    Recommended supportive treatment at home:  OTC Tylenol or Motrin for fever/discomfort.  OTC supportive care for nasal congestion - saline nasal spray/Flonase nasal spray and/or netipot  Humidifier and steam " inhalation/warm showers.  Increase oral fluid intake.  Warm saline gargles for sore throat    Electronically Signed by GISSEL Baltazar

## 2023-03-16 ENCOUNTER — HOSPITAL ENCOUNTER (OUTPATIENT)
Facility: MEDICAL CENTER | Age: 43
End: 2023-03-16
Attending: FAMILY MEDICINE
Payer: COMMERCIAL

## 2023-03-16 ENCOUNTER — OFFICE VISIT (OUTPATIENT)
Dept: MEDICAL GROUP | Facility: MEDICAL CENTER | Age: 43
End: 2023-03-16
Payer: COMMERCIAL

## 2023-03-16 VITALS
RESPIRATION RATE: 16 BRPM | WEIGHT: 182.98 LBS | BODY MASS INDEX: 29.41 KG/M2 | TEMPERATURE: 96.9 F | SYSTOLIC BLOOD PRESSURE: 106 MMHG | HEART RATE: 76 BPM | DIASTOLIC BLOOD PRESSURE: 64 MMHG | OXYGEN SATURATION: 96 % | HEIGHT: 66 IN

## 2023-03-16 DIAGNOSIS — N93.9 ABNORMAL VAGINAL BLEEDING: ICD-10-CM

## 2023-03-16 DIAGNOSIS — N30.01 ACUTE CYSTITIS WITH HEMATURIA: ICD-10-CM

## 2023-03-16 DIAGNOSIS — R30.0 DYSURIA: ICD-10-CM

## 2023-03-16 LAB
APPEARANCE UR: CLEAR
BILIRUB UR STRIP-MCNC: NEGATIVE MG/DL
COLOR UR AUTO: YELLOW
GLUCOSE UR STRIP.AUTO-MCNC: NEGATIVE MG/DL
KETONES UR STRIP.AUTO-MCNC: NEGATIVE MG/DL
LEUKOCYTE ESTERASE UR QL STRIP.AUTO: ABNORMAL
NITRITE UR QL STRIP.AUTO: NEGATIVE
PH UR STRIP.AUTO: 7 [PH] (ref 5–8)
PROT UR QL STRIP: NEGATIVE MG/DL
RBC UR QL AUTO: ABNORMAL
SP GR UR STRIP.AUTO: 1.01
UROBILINOGEN UR STRIP-MCNC: 0.2 MG/DL

## 2023-03-16 PROCEDURE — 81002 URINALYSIS NONAUTO W/O SCOPE: CPT | Performed by: FAMILY MEDICINE

## 2023-03-16 PROCEDURE — 99213 OFFICE O/P EST LOW 20 MIN: CPT | Performed by: FAMILY MEDICINE

## 2023-03-16 PROCEDURE — 87086 URINE CULTURE/COLONY COUNT: CPT

## 2023-03-16 PROCEDURE — 87186 SC STD MICRODIL/AGAR DIL: CPT

## 2023-03-16 PROCEDURE — 87077 CULTURE AEROBIC IDENTIFY: CPT

## 2023-03-16 RX ORDER — PHENAZOPYRIDINE HYDROCHLORIDE 200 MG/1
200 TABLET, FILM COATED ORAL 3 TIMES DAILY PRN
Qty: 6 TABLET | Refills: 0 | Status: SHIPPED | OUTPATIENT
Start: 2023-03-16

## 2023-03-16 RX ORDER — NITROFURANTOIN 25; 75 MG/1; MG/1
100 CAPSULE ORAL 2 TIMES DAILY
Qty: 10 CAPSULE | Refills: 0 | Status: SHIPPED | OUTPATIENT
Start: 2023-03-16 | End: 2023-03-21

## 2023-03-16 ASSESSMENT — FIBROSIS 4 INDEX: FIB4 SCORE: 0.66

## 2023-03-16 NOTE — ASSESSMENT & PLAN NOTE
Acute.  POCT urinalysis came back positive for large leukocyte Estrace.  Patient will be treated with Macrobid 100 mg twice daily x5 days,  Pyridium 200 mg 3 times a day as needed for pain.  Make sure she drinks plenty of fluids.  Urine sent off for culture.

## 2023-03-16 NOTE — ASSESSMENT & PLAN NOTE
Subacute.  Discussed with the patient differentials include perimenopause, uterine fibroid.  Recommended getting a pelvic ultrasound.  Referral placed to GYN for further management.

## 2023-03-16 NOTE — PROGRESS NOTES
Milly Singh is a pleasant 42 y.o. female here for   Chief Complaint   Patient presents with    Dysuria      HPI:   Problem   Abnormal Vaginal Bleeding    Para guard IUD inserted about 3 years ago.  Over the last few months with RLQ pain, sharp in nature.  Menstrual cycles will occur about every 2 weeks, which has been going on for the last few months.  Heavier has noticed a heavier menstruation.  Pain will come and go.  No GYN for follow-up.  Unknown of the age of her mother when she went through her menopausal symptoms.     Acute Cystitis With Hematuria    Started with increase in urgency on Tuesday morning.  History of UTIs felt like a previous UTI.  Started to drink canberry juice.  Got better, but worse at night and yesterday.  Symptoms off/on until today that she started to get cramping.  Brown discharge.  Reports lower back pain.  -nausea and fevers          Current Medicines (including changes today)  Current Outpatient Medications   Medication Sig Dispense Refill    nitrofurantoin (MACROBID) 100 MG Cap Take 1 Capsule by mouth 2 times a day for 5 days. 10 Capsule 0    phenazopyridine (PYRIDIUM) 200 MG Tab Take 1 Tablet by mouth 3 times a day as needed for Severe Pain. 6 Tablet 0    Azelastine HCl 137 MCG/SPRAY Solution Administer  into affected nostril(S).      Multiple Vitamins-Minerals (WOMENS MULTI PO) Take  by mouth.       No current facility-administered medications for this visit.     Past Medical/ Surgical History  She  has a past medical history of Heart burn, History of acute pancreatitis (6/13/2014), History of acute pancreatitis (6/13/2014), Impaired fasting glucose (11/3/2014), and Pancreatitis (6/1/2014).  She  has a past surgical history that includes eye surgery (Jan 2008); gastroscopy with biopsy (7/30/2014); and egd with asp/bx (7/30/2014).     Objective:     /64 (BP Location: Right arm, Patient Position: Sitting, BP Cuff Size: Adult)   Pulse 76   Temp 36.1 °C (96.9 °F) (Temporal)   " Resp 16   Ht 1.676 m (5' 6\")   Wt 83 kg (182 lb 15.7 oz)   SpO2 96%  Body mass index is 29.53 kg/m².    Physical Exam  Constitutional:       General: She is not in acute distress.  HENT:      Head: Normocephalic and atraumatic.   Eyes:      Conjunctiva/sclera: Conjunctivae normal.      Pupils: Pupils are equal, round, and reactive to light.   Pulmonary:      Effort: Pulmonary effort is normal. No respiratory distress.   Abdominal:      General: There is no distension.      Tenderness: There is abdominal tenderness in the right lower quadrant and suprapubic area.   Musculoskeletal:      Cervical back: Normal range of motion and neck supple.   Skin:     General: Skin is warm and dry.      Findings: No rash.   Neurological:      Mental Status: She is alert and oriented to person, place, and time.      Gait: Gait is intact.   Psychiatric:         Mood and Affect: Affect normal.        Imaging:  No new imaging    Labs  No updated labs  Lab Results   Component Value Date/Time    POCCOLOR yellow 03/16/2023 04:12 PM    POCAPPEAR clear 03/16/2023 04:12 PM    POCLEUKEST large 03/16/2023 04:12 PM    POCNITRITE negative 03/16/2023 04:12 PM    POCUROBILIGE 0.2 03/16/2023 04:12 PM    POCPROTEIN negative 03/16/2023 04:12 PM    POCURPH 7.0 03/16/2023 04:12 PM    POCBLOOD trace 03/16/2023 04:12 PM    POCSPGRV 1.010 03/16/2023 04:12 PM    POCKETONES negative 03/16/2023 04:12 PM    POCBILIRUBIN negative 03/16/2023 04:12 PM    POCGLUCUA negative 03/16/2023 04:12 PM      Assessment and Plan:   The following treatment plan was discussed     Problem List Items Addressed This Visit       Acute cystitis with hematuria     Acute.  POCT urinalysis came back positive for large leukocyte Estrace.  Patient will be treated with Macrobid 100 mg twice daily x5 days,  Pyridium 200 mg 3 times a day as needed for pain.  Make sure she drinks plenty of fluids.  Urine sent off for culture.         Relevant Medications    nitrofurantoin (MACROBID) " 100 MG Cap    phenazopyridine (PYRIDIUM) 200 MG Tab    Abnormal vaginal bleeding     Subacute.  Discussed with the patient differentials include perimenopause, uterine fibroid.  Recommended getting a pelvic ultrasound.  Referral placed to GYN for further management.         Relevant Orders    US-PELVIC COMPLETE (TRANSABDOMINAL/TRANSVAGINAL) (COMBO)    Referral to Gynecology        Followup: Return if symptoms worsen or fail to improve.    I have placed POCT urinalysis orders.  The MA is preforming POCT urinalysis orders under the direction of Dr. Hall    Please note that this dictation was created using voice recognition software. I have made every reasonable attempt to correct obvious errors, but I expect that there are errors of grammar and possibly content that I did not discover before finalizing the note.

## 2023-03-17 DIAGNOSIS — R30.0 DYSURIA: ICD-10-CM

## 2023-03-27 ENCOUNTER — PATIENT MESSAGE (OUTPATIENT)
Dept: MEDICAL GROUP | Facility: MEDICAL CENTER | Age: 43
End: 2023-03-27
Payer: COMMERCIAL

## 2023-03-27 DIAGNOSIS — R30.0 DYSURIA: ICD-10-CM

## 2023-03-27 RX ORDER — CIPROFLOXACIN 500 MG/1
500 TABLET, FILM COATED ORAL 2 TIMES DAILY
Qty: 10 TABLET | Refills: 0 | Status: SHIPPED | OUTPATIENT
Start: 2023-03-27 | End: 2023-04-01

## 2023-03-27 NOTE — PROGRESS NOTES
Patient reporting worsening of her urinary tract symptoms.  We will go and prescribe Cipro 500 mg twice daily x5 days.

## 2023-03-31 ENCOUNTER — APPOINTMENT (OUTPATIENT)
Dept: RADIOLOGY | Facility: MEDICAL CENTER | Age: 43
End: 2023-03-31
Attending: FAMILY MEDICINE
Payer: COMMERCIAL

## 2023-04-11 ENCOUNTER — APPOINTMENT (OUTPATIENT)
Dept: MEDICAL GROUP | Facility: MEDICAL CENTER | Age: 43
End: 2023-04-11
Payer: COMMERCIAL

## 2023-04-19 ENCOUNTER — APPOINTMENT (OUTPATIENT)
Dept: RADIOLOGY | Facility: MEDICAL CENTER | Age: 43
End: 2023-04-19
Attending: FAMILY MEDICINE
Payer: COMMERCIAL

## 2023-04-19 DIAGNOSIS — N93.9 ABNORMAL VAGINAL BLEEDING: ICD-10-CM

## 2023-04-19 PROCEDURE — 76856 US EXAM PELVIC COMPLETE: CPT

## 2023-04-21 ENCOUNTER — GYNECOLOGY VISIT (OUTPATIENT)
Dept: OBGYN | Facility: CLINIC | Age: 43
End: 2023-04-21
Payer: COMMERCIAL

## 2023-04-21 DIAGNOSIS — R93.5 ABNORMAL ULTRASOUND OF ENDOMETRIUM: ICD-10-CM

## 2023-04-21 DIAGNOSIS — N92.6 IRREGULAR MENSTRUAL BLEEDING: ICD-10-CM

## 2023-04-21 DIAGNOSIS — Z71.2 ENCOUNTER TO DISCUSS TEST RESULTS: ICD-10-CM

## 2023-04-21 PROCEDURE — 99203 OFFICE O/P NEW LOW 30 MIN: CPT | Performed by: ADVANCED PRACTICE MIDWIFE

## 2023-04-21 ASSESSMENT — FIBROSIS 4 INDEX: FIB4 SCORE: 0.66

## 2023-04-21 NOTE — PROGRESS NOTES
Pt here to discuss abnormal vag bleeding.    Pt states she has been experiencing irregular irregular vag bleeding since december . Pt denies any pain.    Good# [phone]  LMP: 4/2/2023  Pharmacy confirmed   Last PAP: 2020, WNL per pt.  Paragard placed 2020

## 2023-04-21 NOTE — PROGRESS NOTES
"Milly Singh is a 42 y.o. female who presents for US follow up and abnormal bleeding.         HPI Comments: Pt presents for US review and abnormal bleeding.  Patient's last menstrual period was 04/02/2023 (exact date).. Patient has Paragard IUD that was placed in 2020. She normally has 5-6 day menses since placement and they were monthly. Patient reports that her last normal period was in December. It lasted for 4-5 days. Approximately 2 weeks later she started bleeding again. She bled for about 3 days during that time. Next bleed was 6 weeks later. She took pregnancy tests and they were negative prior to bleeding. She has menses at end of February that was normal. She had another period one month later that she categorized as heavy.   Patient primary ordered pelvic US which she completed two days ago and would like to discuss.     She is also reporting having random \"shooting pain\" on the right lower abdomen at top of thigh. She reports that this can happened a few times in a row within 10 minutes but then stops. She reports it is independent of her bleeding. It also does not depend on her position such as standing or sitting. This occurs 5-7 times per month. She has not taken any medication for this. Time will make it go away without intervention.     Review of Systems   Pertinent positives documented in HPI and all other systems reviewed & are negative      Past Medical History:   Diagnosis Date    Heart burn     History of acute pancreatitis 6/13/2014    History of acute pancreatitis 6/13/2014    Found a blister, but disappeared. Took fluid out and repeat CT showed resolved. Followed with GI after and was cleared by them, Advised to return if pain ever came back    Impaired fasting glucose 11/3/2014    Pancreatitis 6/1/2014    Idiopathic June 2014: Lipase was 1407 on admission and 281 on discharge. Lipids were normal. IMAGING: Gallbladder ultrasound showed small simple appearing cyst immediately adjacent to " the pancreatic tail, not felt to be of clinical significance. Unremarkable findings, otherwise         Medications:   Current Outpatient Medications Ordered in Epic   Medication Sig Dispense Refill    Azelastine HCl 137 MCG/SPRAY Solution Administer  into affected nostril(S).      Multiple Vitamins-Minerals (WOMENS MULTI PO) Take  by mouth.      phenazopyridine (PYRIDIUM) 200 MG Tab Take 1 Tablet by mouth 3 times a day as needed for Severe Pain. 6 Tablet 0     No current Epic-ordered facility-administered medications on file.          Objective:   Vital measurements:  Pulse 84   Resp 20   Wt 185 lb   Body mass index is 29.86 kg/m². (Goal BM I>18 <25)    Physical Exam   Nursing note and vitals reviewed.  Constitutional: She is oriented to person, place, and time. She appears well-developed and well-nourished. No distress.     Musculoskeletal: Normal range of motion. She exhibits no edema and no tenderness.     Skin: Skin is warm and dry. No rash noted. She is not diaphoretic. No erythema. No pallor.     Psychiatric: She has a normal mood and affect. Her behavior is normal. Judgment and thought content normal.               Assessment:     1. Abnormal ultrasound of endometrium  US-PELVIC COMPLETE (TRANSABDOMINAL/TRANSVAGINAL) (COMBO)      2. Irregular menstrual bleeding  US-PELVIC COMPLETE (TRANSABDOMINAL/TRANSVAGINAL) (COMBO)      3. Encounter to discuss test results            Plan:   Discussed with patient that at this time, there is no endometrial hyperplasia. We reviewed the images together and I explained that the cystic region could be fluid collection that is incidental and could resolve after next menses. At this time, repeat US in 1 month and follow up with MD to discuss. We did discuss why EMB is not indicated at this time. She is aware IUD is in appropriate location.   Regarding her intermittent right lower abdominal pain, this is non specific and I have asked the patient to keep journal of occurrences.

## 2023-04-23 VITALS — WEIGHT: 185 LBS | HEART RATE: 84 BPM | BODY MASS INDEX: 29.86 KG/M2 | RESPIRATION RATE: 20 BRPM

## 2023-05-23 ENCOUNTER — HOSPITAL ENCOUNTER (OUTPATIENT)
Dept: RADIOLOGY | Facility: MEDICAL CENTER | Age: 43
End: 2023-05-23
Attending: ADVANCED PRACTICE MIDWIFE
Payer: COMMERCIAL

## 2023-05-23 DIAGNOSIS — N92.6 IRREGULAR MENSTRUAL BLEEDING: ICD-10-CM

## 2023-05-23 DIAGNOSIS — R93.5 ABNORMAL ULTRASOUND OF ENDOMETRIUM: ICD-10-CM

## 2023-05-23 PROCEDURE — 76830 TRANSVAGINAL US NON-OB: CPT

## 2023-05-31 ENCOUNTER — GYNECOLOGY VISIT (OUTPATIENT)
Dept: OBGYN | Facility: CLINIC | Age: 43
End: 2023-05-31
Payer: COMMERCIAL

## 2023-05-31 VITALS
WEIGHT: 189 LBS | HEIGHT: 66 IN | DIASTOLIC BLOOD PRESSURE: 74 MMHG | BODY MASS INDEX: 30.37 KG/M2 | SYSTOLIC BLOOD PRESSURE: 118 MMHG

## 2023-05-31 DIAGNOSIS — N93.9 ABNORMAL VAGINAL BLEEDING: Primary | ICD-10-CM

## 2023-05-31 PROCEDURE — 3078F DIAST BP <80 MM HG: CPT | Performed by: OBSTETRICS & GYNECOLOGY

## 2023-05-31 PROCEDURE — 3074F SYST BP LT 130 MM HG: CPT | Performed by: OBSTETRICS & GYNECOLOGY

## 2023-05-31 PROCEDURE — 99213 OFFICE O/P EST LOW 20 MIN: CPT | Performed by: OBSTETRICS & GYNECOLOGY

## 2023-05-31 ASSESSMENT — FIBROSIS 4 INDEX: FIB4 SCORE: 0.66

## 2023-05-31 NOTE — PROGRESS NOTES
"GYN FOLLOW UP VISIT    CC:  US Results       HPI: Patient is a 42 y.o.  who presents for follow up to review US results. She was seen by Rebekah Spaulding CNM in April. She complains of irregular menses starting since about January.  She has noticed sometimes skipping a period or having light spotting and then the next cycle will be heavier.  She denies pain to me today.  An US was obtained which noted a cystic area in the myometrium so rpt US was ordered to assess this.  Endometrial stripe on first US was 3.7mm and on 2nd US was 8mm. IUD (paraguard) is in proper place in central endometrium.       Patient reports +HPV on a pap in 2017 and normal pap in . Advised to schedule annual for rpt pap screening.     ROS:   General: denies fever / chills  HEENT: denies sore throat:  CV: denies chest pain:  Repiratory: denies shortness of breath  GI: denies abdominal pain  : denies dysuria:    PFSH:  I personally reviewed the past medical and surgical histories.       PHYSICAL EXAMINATION:  Vital Signs:   Vitals:    23 1504   BP: 118/74   Weight: 189 lb   Height: 5' 6\"     Body mass index is 30.51 kg/m².    Gen: appears well, NAD  Respiratory: normal effort    Exam deferred      IMAGING:  Us :  HISTORY/REASON FOR EXAM:  Vaginal Bleeding; recheck cystic area in endometrium.        TECHNIQUE/EXAM DESCRIPTION:  Transabdominal and transvaginal pelvic ultrasound.     COMPARISON:   2023     FINDINGS:  Both transabdominal and transvaginal scanning were performed to optimally visualize the pelvis.     UTERUS:  The uterus measures 4.70 cm x 9.32 cm x 6.48 cm.  The uterine myometrium is within normal limits.  The endometrial echo complex measures 0.80 cm. Nabothian cysts in the cervix.  Midline IUD .     Redemonstration of cystic structure in the myometrial fundus, measuring 6 mm, previously 9 mm     OVARIES:  The right ovary measures 3.11 cm x 2.19 cm x 1.45 cm. Duplex Doppler examination of the right ovary " shows normal waveforms.     The left ovary measures 3.28 cm x 2.56 cm x 1.89 cm. Duplex Doppler examination of the left ovary shows normal waveforms.        There is no free fluid seen.     IMPRESSION:     1.  Redemonstration of cystic structure in the myometrial fundus, measuring 6 mm, previously 9 mm.     2.  IUD in place. No thickening of the endometrium  ASSESSMENT AND PLAN:  42 y.o.      1. Abnormal vaginal bleeding   - discussed that her menstrual changes are most likely consistent with age and possibly entering perimenopause transition   - discussed that US in regards to bleeding/endometrium as very NORMAL and not suggestive of concern for hyperplasia or malignancy with thin endometrial stripe   - do not think myometrial cyst is of ANY clinical significance and would unlikely be able to even be seen or evaluated with hysteroscopy as it appears to be within the wall of the uterus.  I am not aware of any clinical significance to myometrial cysts and offered pt reassurance at this time   - if irregular menses continues to be bothersome to patient, would recommend hormonal regulation of menses over paraguard which serves as a contraceptive method but not as a menstrual regulator, and can increase the heaviness of menses     - pt encouraged to schedule annual exam for pap and breast/pelvic exam and other preventative care      Blanca Linton D.O.